# Patient Record
Sex: MALE | Race: WHITE | Employment: UNEMPLOYED | URBAN - NONMETROPOLITAN AREA
[De-identification: names, ages, dates, MRNs, and addresses within clinical notes are randomized per-mention and may not be internally consistent; named-entity substitution may affect disease eponyms.]

---

## 2021-01-27 ENCOUNTER — HOSPITAL ENCOUNTER (INPATIENT)
Age: 61
LOS: 12 days | Discharge: HOME HEALTH | DRG: 885 | End: 2021-02-08
Attending: PSYCHIATRY & NEUROLOGY | Admitting: PSYCHIATRY & NEUROLOGY
Payer: MEDICARE

## 2021-01-27 PROBLEM — F20.9 SCHIZOPHRENIA, CHRONIC WITH ACUTE EXACERBATION (HCC): Status: ACTIVE | Noted: 2021-01-27

## 2021-01-27 PROCEDURE — 1240000000 HC EMOTIONAL WELLNESS R&B

## 2021-01-27 PROCEDURE — 83036 HEMOGLOBIN GLYCOSYLATED A1C: CPT

## 2021-01-27 PROCEDURE — 6370000000 HC RX 637 (ALT 250 FOR IP): Performed by: PSYCHIATRY & NEUROLOGY

## 2021-01-27 PROCEDURE — 6360000002 HC RX W HCPCS: Performed by: NURSE PRACTITIONER

## 2021-01-27 PROCEDURE — 6370000000 HC RX 637 (ALT 250 FOR IP): Performed by: NURSE PRACTITIONER

## 2021-01-27 RX ORDER — ACETAMINOPHEN 500 MG
500 TABLET ORAL EVERY 4 HOURS PRN
Status: DISCONTINUED | OUTPATIENT
Start: 2021-01-27 | End: 2021-02-08 | Stop reason: HOSPADM

## 2021-01-27 RX ORDER — IBUPROFEN 600 MG/1
600 TABLET ORAL EVERY 8 HOURS PRN
Status: ON HOLD | COMMUNITY
End: 2021-02-08 | Stop reason: HOSPADM

## 2021-01-27 RX ORDER — ACETAMINOPHEN 325 MG/1
325 TABLET ORAL EVERY 4 HOURS PRN
Status: DISCONTINUED | OUTPATIENT
Start: 2021-01-27 | End: 2021-02-08 | Stop reason: HOSPADM

## 2021-01-27 RX ORDER — ACETAMINOPHEN 325 MG/1
650 TABLET ORAL EVERY 4 HOURS PRN
Status: DISCONTINUED | OUTPATIENT
Start: 2021-01-27 | End: 2021-01-27

## 2021-01-27 RX ORDER — HALOPERIDOL 5 MG/ML
5 INJECTION INTRAMUSCULAR EVERY 4 HOURS PRN
Status: DISCONTINUED | OUTPATIENT
Start: 2021-01-27 | End: 2021-02-08 | Stop reason: HOSPADM

## 2021-01-27 RX ORDER — POLYETHYLENE GLYCOL 3350 17 G/17G
17 POWDER, FOR SOLUTION ORAL DAILY PRN
Status: DISCONTINUED | OUTPATIENT
Start: 2021-01-27 | End: 2021-02-08 | Stop reason: HOSPADM

## 2021-01-27 RX ORDER — ATORVASTATIN CALCIUM 20 MG/1
20 TABLET, FILM COATED ORAL DAILY
Status: ON HOLD | COMMUNITY
End: 2021-02-08 | Stop reason: HOSPADM

## 2021-01-27 RX ORDER — SULFAMETHOXAZOLE AND TRIMETHOPRIM 800; 160 MG/1; MG/1
1 TABLET ORAL 2 TIMES DAILY
Status: ON HOLD | COMMUNITY
Start: 2021-01-25 | End: 2021-02-08 | Stop reason: HOSPADM

## 2021-01-27 RX ORDER — ARIPIPRAZOLE 30 MG/1
30 TABLET ORAL DAILY
Status: ON HOLD | COMMUNITY
End: 2021-02-08 | Stop reason: HOSPADM

## 2021-01-27 RX ORDER — ALBUTEROL SULFATE 90 UG/1
2 AEROSOL, METERED RESPIRATORY (INHALATION) EVERY 6 HOURS PRN
COMMUNITY

## 2021-01-27 RX ORDER — LORAZEPAM 1 MG/1
1 TABLET ORAL EVERY 4 HOURS PRN
Status: DISCONTINUED | OUTPATIENT
Start: 2021-01-27 | End: 2021-02-08 | Stop reason: HOSPADM

## 2021-01-27 RX ORDER — FAMOTIDINE 40 MG/1
40 TABLET, FILM COATED ORAL DAILY
Status: ON HOLD | COMMUNITY
End: 2021-02-08 | Stop reason: HOSPADM

## 2021-01-27 RX ORDER — TRAZODONE HYDROCHLORIDE 100 MG/1
100 TABLET ORAL NIGHTLY
Status: DISCONTINUED | OUTPATIENT
Start: 2021-01-27 | End: 2021-02-04

## 2021-01-27 RX ORDER — ACETAMINOPHEN 325 MG/1
650 TABLET ORAL EVERY 4 HOURS PRN
Status: DISCONTINUED | OUTPATIENT
Start: 2021-01-27 | End: 2021-02-08 | Stop reason: HOSPADM

## 2021-01-27 RX ORDER — MELOXICAM 15 MG/1
15 TABLET ORAL DAILY PRN
Status: ON HOLD | COMMUNITY
End: 2021-02-08 | Stop reason: HOSPADM

## 2021-01-27 RX ORDER — DIPHENHYDRAMINE HYDROCHLORIDE 50 MG/ML
50 INJECTION INTRAMUSCULAR; INTRAVENOUS EVERY 4 HOURS PRN
Status: DISCONTINUED | OUTPATIENT
Start: 2021-01-27 | End: 2021-02-08 | Stop reason: HOSPADM

## 2021-01-27 RX ORDER — IBUPROFEN 600 MG/1
600 TABLET ORAL EVERY 8 HOURS PRN
Status: DISCONTINUED | OUTPATIENT
Start: 2021-01-27 | End: 2021-02-08 | Stop reason: HOSPADM

## 2021-01-27 RX ORDER — LORAZEPAM 2 MG/ML
2 INJECTION INTRAMUSCULAR EVERY 4 HOURS PRN
Status: DISCONTINUED | OUTPATIENT
Start: 2021-01-27 | End: 2021-02-08 | Stop reason: HOSPADM

## 2021-01-27 RX ORDER — DOXEPIN HYDROCHLORIDE 10 MG/1
10 CAPSULE ORAL NIGHTLY
Status: ON HOLD | COMMUNITY
End: 2021-02-08 | Stop reason: HOSPADM

## 2021-01-27 RX ORDER — NICOTINE 21 MG/24HR
1 PATCH, TRANSDERMAL 24 HOURS TRANSDERMAL DAILY
Status: DISCONTINUED | OUTPATIENT
Start: 2021-01-27 | End: 2021-02-08 | Stop reason: HOSPADM

## 2021-01-27 RX ADMIN — TRAZODONE HYDROCHLORIDE 100 MG: 100 TABLET ORAL at 20:23

## 2021-01-27 RX ADMIN — LORAZEPAM 2 MG: 2 INJECTION INTRAMUSCULAR; INTRAVENOUS at 15:15

## 2021-01-27 RX ADMIN — LORAZEPAM 1 MG: 1 TABLET ORAL at 20:23

## 2021-01-27 RX ADMIN — IBUPROFEN 600 MG: 600 TABLET, FILM COATED ORAL at 20:23

## 2021-01-27 RX ADMIN — DIPHENHYDRAMINE HYDROCHLORIDE 50 MG: 50 INJECTION, SOLUTION INTRAMUSCULAR; INTRAVENOUS at 15:15

## 2021-01-27 RX ADMIN — HALOPERIDOL LACTATE 5 MG: 5 INJECTION, SOLUTION INTRAMUSCULAR at 15:15

## 2021-01-27 SDOH — HEALTH STABILITY: MENTAL HEALTH: HOW OFTEN DO YOU HAVE A DRINK CONTAINING ALCOHOL?: 4 OR MORE TIMES A WEEK

## 2021-01-27 ASSESSMENT — PAIN SCALES - GENERAL
PAINLEVEL_OUTOF10: 0
PAINLEVEL_OUTOF10: 6

## 2021-01-27 ASSESSMENT — LIFESTYLE VARIABLES: HISTORY_ALCOHOL_USE: YES

## 2021-01-27 NOTE — BH NOTE
Patients belongings collected      Brianedith Medina 1046 (CODE [id number])  [address]   [facility] Bag with [address] with toiletries and hygiene       Patients Room [address] [address]

## 2021-01-27 NOTE — PROGRESS NOTES
Pt arrived to unit and assisted to room from cot. Pt yelling, slamming door and threatening to cut staff after getting into room. Pt pacing around the room, with rapid speech, unintelligible at times, that he is getting out of here and to get his stuff so he can leave. Elliot Smith NP, called and orders for Benadryl 50 mg IM, Haldol 5 mg IM and Ativan 2mg IM given at 1515 emergently due to pt's behaviors.

## 2021-01-28 PROBLEM — F25.0 SCHIZOAFFECTIVE DISORDER, BIPOLAR TYPE (HCC): Status: ACTIVE | Noted: 2021-01-27

## 2021-01-28 LAB
ALBUMIN SERPL-MCNC: 4.2 GM/DL (ref 3.4–5)
ALBUMIN SERPL-MCNC: 4.2 GM/DL (ref 3.4–5)
ALP BLD-CCNC: 151 IU/L (ref 40–129)
ALP BLD-CCNC: 151 IU/L (ref 40–129)
ALT SERPL-CCNC: 16 U/L (ref 10–40)
ALT SERPL-CCNC: 16 U/L (ref 10–40)
ANION GAP SERPL CALCULATED.3IONS-SCNC: 5 MMOL/L (ref 4–16)
AST SERPL-CCNC: 22 IU/L (ref 15–37)
AST SERPL-CCNC: 22 IU/L (ref 15–37)
BILIRUB SERPL-MCNC: 0.2 MG/DL (ref 0–1)
BILIRUB SERPL-MCNC: 0.2 MG/DL (ref 0–1)
BILIRUBIN DIRECT: 0.2 MG/DL (ref 0–0.3)
BILIRUBIN, INDIRECT: 0 MG/DL (ref 0–0.7)
BUN BLDV-MCNC: 21 MG/DL (ref 6–23)
CALCIUM SERPL-MCNC: 8.7 MG/DL (ref 8.3–10.6)
CHLORIDE BLD-SCNC: 89 MMOL/L (ref 99–110)
CHOLESTEROL, FASTING: 151 MG/DL
CO2: 29 MMOL/L (ref 21–32)
CREAT SERPL-MCNC: 1.1 MG/DL (ref 0.9–1.3)
ESTIMATED AVERAGE GLUCOSE: 117 MG/DL
GFR AFRICAN AMERICAN: >60 ML/MIN/1.73M2
GFR NON-AFRICAN AMERICAN: >60 ML/MIN/1.73M2
GLUCOSE BLD-MCNC: 81 MG/DL (ref 70–99)
HBA1C MFR BLD: 5.7 % (ref 4.2–6.3)
HDLC SERPL-MCNC: 62 MG/DL
LDL CHOLESTEROL DIRECT: 77 MG/DL
POTASSIUM SERPL-SCNC: 4.4 MMOL/L (ref 3.5–5.1)
SODIUM BLD-SCNC: 123 MMOL/L (ref 135–145)
T4 FREE: 1.2 NG/DL (ref 0.9–1.8)
TOTAL PROTEIN: 6.8 GM/DL (ref 6.4–8.2)
TOTAL PROTEIN: 6.8 GM/DL (ref 6.4–8.2)
TRIGLYCERIDE, FASTING: 96 MG/DL
TSH HIGH SENSITIVITY: 1.15 UIU/ML (ref 0.27–4.2)

## 2021-01-28 PROCEDURE — 83036 HEMOGLOBIN GLYCOSYLATED A1C: CPT

## 2021-01-28 PROCEDURE — 6370000000 HC RX 637 (ALT 250 FOR IP): Performed by: PSYCHIATRY & NEUROLOGY

## 2021-01-28 PROCEDURE — 36415 COLL VENOUS BLD VENIPUNCTURE: CPT

## 2021-01-28 PROCEDURE — 86592 SYPHILIS TEST NON-TREP QUAL: CPT

## 2021-01-28 PROCEDURE — 84439 ASSAY OF FREE THYROXINE: CPT

## 2021-01-28 PROCEDURE — 1240000000 HC EMOTIONAL WELLNESS R&B

## 2021-01-28 PROCEDURE — 99223 1ST HOSP IP/OBS HIGH 75: CPT | Performed by: NURSE PRACTITIONER

## 2021-01-28 PROCEDURE — 80061 LIPID PANEL: CPT

## 2021-01-28 PROCEDURE — 80053 COMPREHEN METABOLIC PANEL: CPT

## 2021-01-28 PROCEDURE — 82248 BILIRUBIN DIRECT: CPT

## 2021-01-28 PROCEDURE — 84443 ASSAY THYROID STIM HORMONE: CPT

## 2021-01-28 PROCEDURE — 6370000000 HC RX 637 (ALT 250 FOR IP): Performed by: NURSE PRACTITIONER

## 2021-01-28 RX ORDER — SULFAMETHOXAZOLE AND TRIMETHOPRIM 800; 160 MG/1; MG/1
1 TABLET ORAL 2 TIMES DAILY
Status: COMPLETED | OUTPATIENT
Start: 2021-01-28 | End: 2021-01-29

## 2021-01-28 RX ORDER — ARIPIPRAZOLE 10 MG/1
30 TABLET ORAL DAILY
Status: DISCONTINUED | OUTPATIENT
Start: 2021-01-28 | End: 2021-01-28

## 2021-01-28 RX ORDER — ATORVASTATIN CALCIUM 20 MG/1
20 TABLET, FILM COATED ORAL NIGHTLY
Status: DISCONTINUED | OUTPATIENT
Start: 2021-01-28 | End: 2021-02-08 | Stop reason: HOSPADM

## 2021-01-28 RX ADMIN — ATORVASTATIN CALCIUM 20 MG: 20 TABLET, FILM COATED ORAL at 20:39

## 2021-01-28 RX ADMIN — TRAZODONE HYDROCHLORIDE 100 MG: 100 TABLET ORAL at 20:39

## 2021-01-28 RX ADMIN — HALOPERIDOL 7.5 MG: 5 TABLET ORAL at 20:38

## 2021-01-28 RX ADMIN — MUPIROCIN: 20 OINTMENT TOPICAL at 13:36

## 2021-01-28 RX ADMIN — LORAZEPAM 1 MG: 1 TABLET ORAL at 21:37

## 2021-01-28 RX ADMIN — ACETAMINOPHEN 650 MG: 325 TABLET ORAL at 08:45

## 2021-01-28 RX ADMIN — ACETAMINOPHEN 650 MG: 325 TABLET ORAL at 14:50

## 2021-01-28 RX ADMIN — SULFAMETHOXAZOLE AND TRIMETHOPRIM 1 TABLET: 800; 160 TABLET ORAL at 20:38

## 2021-01-28 RX ADMIN — SULFAMETHOXAZOLE AND TRIMETHOPRIM 1 TABLET: 800; 160 TABLET ORAL at 13:15

## 2021-01-28 RX ADMIN — IBUPROFEN 600 MG: 600 TABLET, FILM COATED ORAL at 21:37

## 2021-01-28 RX ADMIN — ARIPIPRAZOLE 30 MG: 10 TABLET ORAL at 13:15

## 2021-01-28 ASSESSMENT — PAIN SCALES - GENERAL
PAINLEVEL_OUTOF10: 8
PAINLEVEL_OUTOF10: 2
PAINLEVEL_OUTOF10: 6
PAINLEVEL_OUTOF10: 7
PAINLEVEL_OUTOF10: 0

## 2021-01-28 NOTE — GROUP NOTE
Group Therapy Note    Date: 1/28/2021    Group Start Time: 3735  Group End Time: 9145    Number of Participants: 3/6    Type: Spirituality    Group Topic/Objective: Religous discussion with Blanka Goode. Notes:  Pt attended group, but unable to remain focused on the discussion or the activity. Status After Intervention:  Unchanged    Participation Level: Minimal    Participation Quality: Inappropriate    Speech:  pressured and rapid    Thought Process/Content: Circumstantial    Affective Functioning: Exaggerated    Mood: elevated    Level of consciousness:  Preoccupied and Inattentive    Response to Learning: Pt unable to demonstrate response to learning at this time.      Endings: None Reported    Modes of Intervention: Education, Support and Socialization    Discipline Responsible: Certified Therapeutic Recreation Specialist     Electronically signed by Garry Go [address] [place] [address] MA on 1/28/2021 at 10:39 AM

## 2021-01-28 NOTE — PLAN OF CARE
[facility]  Initial Interdisciplinary Treatment Plan NOTE    Review Date & Time: 01/28 0830    Admission Type:   Admission Type:  Involuntary    Reason for admission:  Reason for Admission: Acute schizophrenia    Patient Admission Diagnosis: schizophrenia, chronic with acute exacerbation      PATIENT STRENGTHS:  Patient Strengths Strengths: (TIARA)  Patient Strengths and Limitations:Limitations: Multiple barriers to leisure interests, Difficult relationships / poor social skills  Addictive Behavior:Addictive Behavior  In the past 3 months, have you felt or has someone told you that you have a problem with:  : (admits to many addictive behaviors)  Do you have a history of Chemical Use?: Yes  Do you have a history of Alcohol Use?: Yes  Do you have a history of Street Drug Abuse?: Yes  Histroy of Prescripton Drug Abuse?: Yes  Medical Problems:  Past Medical History:   Diagnosis Date    Arthritis     COPD (chronic obstructive pulmonary disease) ([facility] [address]     Hyperlipidemia        EDUCATION:   Learner Progress Toward Treatment Goals: Reviewed goals and plan of care    Method: Group    Outcome: Needs reinforcement    PATIENT GOALS: med titration    PLAN/TREATMENT RECOMMENDATIONS UPDATE: med titration, compliance with unit programming    Estimated Length of Stay Update:  7 days  Estimated Discharge Date Update: 2/4/21  Discharge Criteria: med titration    SHORT-TERM GOALS UPDATE:   Time frame for Short-Term Goals: 5 days    LONG-TERM GOALS UPDATE:   Time frame for Long-Term Goals: 7 days  Members Present in Team Meeting: See Signature Sheet      PARAMJIT Orta

## 2021-01-28 NOTE — GROUP NOTE
Group Therapy Note    Date: 1/28/2021    Group Start Time: 0830  Group End Time: 0900    Number of Participants: 6/6    Type: Morning Goals Group/ Community Meeting    Group Topic/Objective: Set Goal For The Day and to review Unit Rules and Regulations. Patient's Goal:  Pt states his goal is \"Take a shower. \"     Notes:  Pt presented with rapid, tangential speech and flight of ideas. Pt did not stop talking throughout group, but would answer questions when addressed. Pt states he is feeling \"fine\" and is grateful \"I'm here. \"  Pt reports restful sleep of ~8 hours. Depression (0-10): 0    Anxiety (0-10): 0    Irritability/Aggitation (0-10): 0    Status After Intervention:  Improved    Participation Level: Interactive    Participation Quality: Pt talked throughout group, but would answer questions when addressed.      Speech:  Pressured, rapid, and tangential.     Thought Process/Content: Flight of Ideas    Affective Functioning: Exaggerated    Mood: elevated    Level of consciousness:  Preoccupied    Response to Learning: Able to verbalize current knowledge/experience    Endings: None Reported    Modes of Intervention: Education, Support and Socialization    Discipline Responsible: Certified Therapeutic Recreation Specialist     Electronically signed by Evangelina Carey MA on 1/28/2021 at 9:25 AM

## 2021-01-28 NOTE — PROGRESS NOTES
Pt seen by therapist from approximately [phone]. Pt states reason for admission is d/t \"I have an infection in the bottom of my toe. \"  Pt unable to identify positive leisure interests. Pt has no insight into his mental health.      Electronically signed by CLARISSE Cheung MA on 1/29/2021 at 10:05 AM

## 2021-01-28 NOTE — CONSULTS
Hospitalist Consult Note      Name:  Germania Rojas /Age/Sex: 1960  (61 y.o. male)   MRN & CSN:  [id number] & [id number] Admission Date/Time: 2021  3:31 PM   Location:  [facility]- PCP: No primary care provider on file. Hospital Day: 2    Assessment and Plan:   Germania Rojas is a 61 y.o.  male  who presents with Schizophrenia, chronic with acute exacerbation [facility])  Hospitalist Team consulted for: Medical Management    1. Hyponatremia: 123. Prerenal component with a BUN/creatinine ratio greater than 20. Also with antipsychotics. Recommend Gatorade versus free water. If no improvement can add sodium chloride 1 g 3 times daily with meals. Continue to monitor patient. Patient with no neurological deficit with psychiatric conditions complicating same. Daily BMPs for monitoring    2. Hyperlipidemia: Pending fasting lipid. However currently on atorvastatin 20 mg at bedtime continue same. 3.  Arthritis of the hands: Patient has ibuprofen for same. 600 mg 3 times daily as needed. 4.  Tardive dyskinesia: Lip smacking noted. Most likely with antipsychotic use. Continue valbenazine tosilate 80 mg daily    Appreciate allowing us participate in patient's care. Will follow along loosely due to hyponatremia. We will be available    Diet DIET GENERAL;   DVT Prophylaxis [] Lovenox, []  Heparin, [] SCDs, [] No VTE prophylaxis, patient ambulating   GI Prophylaxis [] PPI, [] H2 Blocker, [] No GI prophylaxis, patient is receiving diet/Tube Feeds   Code Status Full Code   Disposition Patient requires continued admission due to an SBU   MDM [] Low, [x] Moderate,[]  High  Patient's risk as above due to as above     History of Present Illness:     Pt S&E. No acute events overnight. Patient sitting at group table alone. Agrees to exam.  Denies any headache blurred vision or dizziness. Denies any chest pain palpitation shortness of breath. Denies any fever chills nausea or vomiting or diarrhea. Denies any abdominal pain. Denies any myalgias. Is complaining of left thumb pain that is chronic. Denies functional loss or decreased range of motion. Takes ibuprofen for same. Patient hard to redirect during interview and did not answer some questions. .      10-14 point ROS reviewed negative, unless as noted above    Objective:   No intake or output data in the 24 hours ending 01/28/21 1223   Vitals:   Vitals:    01/28/21 0745   BP: 116/66   Pulse: 93   Resp: 18   Temp: 97.3 °F (36.3 °C)   SpO2: 100%     Physical Exam:    GEN Awake male, sitting upright in bed in no apparent distress. Appears given age. EYES Pupils are equally round. No scleral erythema, discharge, or conjunctivitis. HENT Mucous membranes are moist. Oral pharynx without exudates, no evidence of thrush. NECK Supple, no apparent thyromegaly or masses. RESP Clear to auscultation, no wheezes, rales or rhonchi. Symmetric chest movement while on room air. CARDIO/VASC S1/S2 auscultated. Regular rate without appreciable murmurs, rubs, or gallops. No JVD or carotid bruits. Peripheral pulses equal bilaterally and palpable. No peripheral edema. GI Abdomen is soft without significant tenderness, masses, or guarding. Bowel sounds are normoactive. Rectal exam deferred.  No costovertebral angle tenderness. Normal appearing external genitalia. Harrison catheter is not present. HEME/LYMPH No palpable cervical lymphadenopathy and no hepatosplenomegaly. No petechiae or ecchymoses. MSK No gross joint deformities. SKIN Normal coloration, warm, dry. NEURO Cranial nerves appear grossly intact, normal speech, no lateralizing weakness. PSYCH Awake, alert, oriented x 4. Affect appropriate.     Medications:   Medications:    ARIPiprazole  30 mg Oral Daily    atorvastatin  20 mg Oral Nightly    Valbenazine Tosylate  80 mg Oral Daily    sulfamethoxazole-trimethoprim  1 tablet Oral BID    mupirocin   Topical Daily    nicotine  1 patch Transdermal Daily    influenza virus vaccine  0.5 mL Intramuscular Prior to discharge    traZODone  100 mg Oral Nightly      Infusions:   PRN Meds:     polyethylene glycol, 17 g, Daily PRN      haloperidol lactate, 5 mg, Q4H PRN    And      LORazepam, 2 mg, Q4H PRN    And      diphenhydrAMINE, 50 mg, Q4H PRN      acetaminophen, 650 mg, Q4H PRN      acetaminophen, 500 mg, Q4H PRN      acetaminophen, 325 mg, Q4H PRN      ibuprofen, 600 mg, Q8H PRN      LORazepam, 1 mg, Q4H PRN          Electronically signed by Marcella Aguirre MD on 1/28/2021 at 12:23 PM

## 2021-01-28 NOTE — PLAN OF CARE
Problem: Anger Management/Homicidal Ideation:  Goal: Able to display appropriate communication and problem solving  Description: Able to display appropriate communication and problem solving  Outcome: Ongoing  Goal: Ability to verbalize frustrations and anger appropriately will improve  Description: Ability to verbalize frustrations and anger appropriately will improve  Outcome: Ongoing  Goal: Absence of angry outbursts  Description: Absence of angry outbursts  Outcome: Ongoing  Goal: Absence of homicidal ideation  Description: Absence of homicidal ideation  Outcome: Ongoing  Goal: Participates in care planning  Description: Participates in care planning  Outcome: Ongoing  Goal: Patient specific goal  Description: Patient specific goal  Outcome: Ongoing     Problem: Pain:  Description: Pain management should include both nonpharmacologic and pharmacologic interventions.   Goal: Pain level will decrease  Description: Pain level will decrease  Outcome: Ongoing  Goal: Control of acute pain  Description: Control of acute pain  Outcome: Ongoing  Goal: Control of chronic pain  Description: Control of chronic pain  Outcome: Ongoing

## 2021-01-28 NOTE — H&P
thoughts to harm anyone else. Pt denied any auditory or visiual hallucintations. Social History:   He quit school in 11th grade. Patient attained a GED. He participated in [employer]. Denies being in the [facility]. He worked in [employer] and at age 21 received SSDI for schizophrenia. He has never  and has no children. Per patient he has a fiancee for the last five years. Legal entanglements include past of 3 disorderly conducts, criminal mischief and most recently allegedly acused of bomb threat. Patient has hx of [facility] time. Substance use Hx  Opiates, marijuana, methamphetamine, IV Dr use, Rx drug abuse    Past Psychiatric History:   Most recently was at [facility] for Psychiatry in [facility],  last month and linked with \"integrated services\"  Notes he has had multiple psych admissions related to schizophrenia and SAs. One was after he cut his wrists. Pt denied any hx of seizures, TBIs, Hep C or HIV  No TD noted, AIMS=0    Pt noted hx of previous inpt psychiatric admissions- see above  Pt reported several previous suicide attempts  Pt denied any family hx of suicides  Pt noted family mental health hx- father had schizophrenia    Pt denied any hx of abuse trauma or neglect. Alcohol: hx of legal complications including 3 disorderly conducts, criminal mischief involving alcohol use. Reports last use was at [facility].    Street drugs: history of crack, opiates, Mj  Tobacco: 1 PPD of cigarettes,  One pack of cigars daily  Caffeine: 5 per day- tea, energy drinks, coffee      Past Psychiatric History:   See note above    Family Psychiatric History:   See note above    Family Psychiatric History:   Family History   Problem Relation Age of Onset    Other Father     Other Sister         Allergies:  No Known Allergies     OBJECTIVE  Vital Signs:  Vitals:    01/28/21 0745   BP: 116/66   Pulse: 93   Resp: 18   Temp: 97.3 °F (36.3 °C)   SpO2: 100%       Labs:  Recent Results (from the past 48 hour(s))   TSH without Reflex    Collection Time: 01/28/21  7:00 AM   Result Value Ref Range    TSH, High Sensitivity 1.150 0.270 - 4.20 uIu/ml   T4, free    Collection Time: 01/28/21  7:00 AM   Result Value Ref Range    T4 Free 1.20 0.9 - 1.8 NG/DL   Comprehensive Metabolic Panel w/ Reflex to MG    Collection Time: 01/28/21  7:00 AM   Result Value Ref Range    Sodium 123 (L) 135 - 145 MMOL/L    Potassium 4.4 3.5 - 5.1 MMOL/L    Chloride 89 (L) 99 - 110 mMol/L    CO2 29 21 - 32 MMOL/L    BUN 21 6 - 23 MG/DL    CREATININE 1.1 0.9 - 1.3 MG/DL    Glucose 81 70 - 99 MG/DL    Calcium 8.7 8.3 - 10.6 MG/DL    Albumin 4.2 3.4 - 5.0 GM/DL    Total Protein 6.8 6.4 - 8.2 GM/DL    Total Bilirubin 0.2 0.0 - 1.0 MG/DL    ALT 16 10 - 40 U/L    AST 22 15 - 37 IU/L    Alkaline Phosphatase 151 (H) 40 - 129 IU/L    GFR Non-African American >60 >60 mL/min/1.73m2    GFR African American >60 >60 mL/min/1.73m2    Anion Gap 5 4 - 16   Hepatic function panel    Collection Time: 01/28/21  7:00 AM   Result Value Ref Range    Albumin 4.2 3.4 - 5.0 GM/DL    Total Bilirubin 0.2 0.0 - 1.0 MG/DL    Bilirubin, Direct 0.2 0.0 - 0.3 MG/DL    Bilirubin, Indirect 0.0 0 - 0.7 MG/DL    Alkaline Phosphatase 151 (H) 40 - 129 IU/L    AST 22 15 - 37 IU/L    ALT 16 10 - 40 U/L    Total Protein 6.8 6.4 - 8.2 GM/DL       Review of Systems:  Reports of no current cardiovascular, respiratory, gastrointestinal, genitourinary, integumentary, neurological, muscuoskeletal, or immunological symptoms today. PSYCHIATRIC: See HPI above. Neurologic examination:  Mental status: The patient is alert, attentive, and oriented. Speech is pressured and fluent with good repetition, comprehension, and naming. Cranial nerves:  CN II: Visual fields are full to confrontation. CN III, IV, VI: At primary gaze, there is no eye deviation. EOMs intact    CN V: Facial sensation is intact to pinprick in all 3 divisions bilaterally.       CN VII: Face is symmetric with normal eye closure and smile. CN VII: Hearing is normal to rubbing fingers    CN IX, X: Palate elevates symmetrically. Phonation is normal.    CN XI: Head turning(difficult due to arthritis) and shoulder shrug are intact    CN XII: Tongue is midline with normal movements and no atrophy. Motor: There is no pronator drift of out-stretched arms. Muscle bulk and tone are normal. Strength is full bilaterally. Reflexes:  Reflexes are 2+ and symmetric at the biceps, triceps, knees,    Sensory:  Light touch, pinprick, position sense, and vibration sense are intact in fingers and toes. Coordination:  Rapid alternating movements and fine finger movements are intact. There is no dysmetria on finger-to-nose and heel-knee-shin. There are no abnormal or extraneous movements. Romberg is absent. Gait/Stance:  Posture is normal. Gait is steady with normal steps, base, arm swing, and turning. Heel and toe walking are normal. Tandem gait is normal when the patient closes one of her eyes. PSYCHIATRIC EXAMINATION / MENTAL STATUS EXAM    CONSTITUTIONAL:    Vitals:   Vitals:    01/28/21 0745   BP: 116/66   Pulse: 93   Resp: 18   Temp: 97.3 °F (36.3 °C)   SpO2: 100%      General appearance: [x] appears age, []  appears older than stated age,               [x]  adequately dressed and groomed, [] disheveled,               [x]  in no acute distress, [] appears mildly distressed, [] other           MUSCULOSKELETAL:   Gait:   [x] normal, [] antalgic, [] unsteady, [] gait not evaluated   Station:             [] erect, [x] sitting, [] recumbent, [] other        Strength/tone:  [x] muscle strength and tone appear consistent with age and                                        condition     [] atrophy      [] abnormal movements  PSYCHIATRIC:    Appearance: appears stated age. alert and oriented to person, place, time & situation. no acute distress. Adequate grooming and hygeine. Good eye contact.  No prominent physical abnormalities. Attitude: Manner is cooperative and pleasant but with boughts of irritability  Motor: No psychomotor agitation, retardation or abnormal movements noted  Speech: Pressured and difficult to interrupt, Normal volume, tone &  Increased amount. Language: intact understanding and production  Mood: depressed  Affect: euthymic, full range, non-labile, congruent with mood and content of speech  Thought Production: Spontaneous. Thought Form: Coherent, linear, logical & goal-directed. No tangentiality or circumstantiality. No flight of ideas or loosening of associations. Thought Content/Perceptions: No JANE, no AVH, positive delusions and paranoia  Insight: limited  Judgment- limited  Memory: Immediate, recent, and remote appear intact, though not formally tested. Attention: maintained throughout interview  Fund of knowledge: Average  Gait/Balance: WNL/WNL         Impression:   Schizoaffective, currently depressed and paranoid    Problem List:   Schizoaffective disorder, bipolar type ([facility] [address]    Plan:  1. Admit to [facility] Unit  2. Consult hospitalist to evaluate and treat medical conditions  3. Adjust psychotropic medications to target symptoms  4. Occupational Therapy, Physical Therapy, Group Psychotherapy as tolerated  5. Reviewed treatment plan with patient including medication risks, benefits, side effects. Obtained informed consent for treatment. 6. Anticipated length of stay 10-12 days  7. I certify that inpatient psychiatric hospital admission is medically necessary for treatment, which can be expected to improve the patient's condition, and/or for diagnostic study. 8. Psychiatric management:medication initiation and titration, group and individual therapy, safe and theraputic environment. 9. Status of problem/condition: ?Improving  10. Medical co-morbidities: [facility], appreciate assistance  11. Legal Status:Pending  12.  The treatment team reviewed with the patient the diagnosis and treatment recommendations to include the risks, benefits, and side effects of chosen medications. 13. The patient verbalized understanding and agreed with the treatment regimen as outlined above. 14. The patient was encouraged to participate in groups. 15. Medical records, Labs, Diagnotic tests reviewed  16. q15 min safety checks for safety  17. Interval History. 18. Review current labs- BMP for 1/30/2021 to recheck NA. 19. Continue current medications- include gatorade due to low Na 123. Start to decrease patient from [address] and then begin Haldol 7.5 mg bid for acute exacerbation of schizophrenia  20. Supportive Therapy Provided  21. Pt had an opportunity to ask questions and address concerns  22. Pt encouraged to continue therapy group or individual.  23. Pt was in agreement with treatment plan. 24. The risks benefits and side effects of medications were discussed with the patient, including alternatives and no treatment.           Electronically signed by GARCIA Garcia CNP on 1/28/2021 at 12:47 PM

## 2021-01-29 LAB — RPR: NON REACTIVE

## 2021-01-29 PROCEDURE — 99232 SBSQ HOSP IP/OBS MODERATE 35: CPT | Performed by: NURSE PRACTITIONER

## 2021-01-29 PROCEDURE — 6370000000 HC RX 637 (ALT 250 FOR IP): Performed by: NURSE PRACTITIONER

## 2021-01-29 PROCEDURE — 1240000000 HC EMOTIONAL WELLNESS R&B

## 2021-01-29 RX ORDER — ARIPIPRAZOLE 5 MG/1
5 TABLET ORAL DAILY
Status: DISCONTINUED | OUTPATIENT
Start: 2021-01-31 | End: 2021-01-31

## 2021-01-29 RX ADMIN — HALOPERIDOL 7.5 MG: 5 TABLET ORAL at 09:09

## 2021-01-29 RX ADMIN — ATORVASTATIN CALCIUM 20 MG: 20 TABLET, FILM COATED ORAL at 20:35

## 2021-01-29 RX ADMIN — TRAZODONE HYDROCHLORIDE 100 MG: 100 TABLET ORAL at 20:34

## 2021-01-29 RX ADMIN — ACETAMINOPHEN 650 MG: 325 TABLET ORAL at 01:24

## 2021-01-29 RX ADMIN — SULFAMETHOXAZOLE AND TRIMETHOPRIM 1 TABLET: 800; 160 TABLET ORAL at 09:09

## 2021-01-29 RX ADMIN — ARIPIPRAZOLE 15 MG: 10 TABLET ORAL at 09:09

## 2021-01-29 RX ADMIN — SULFAMETHOXAZOLE AND TRIMETHOPRIM 1 TABLET: 800; 160 TABLET ORAL at 20:32

## 2021-01-29 RX ADMIN — ACETAMINOPHEN 650 MG: 325 TABLET ORAL at 18:36

## 2021-01-29 RX ADMIN — MUPIROCIN: 20 OINTMENT TOPICAL at 09:10

## 2021-01-29 RX ADMIN — HALOPERIDOL 7.5 MG: 5 TABLET ORAL at 20:32

## 2021-01-29 ASSESSMENT — PAIN SCALES - GENERAL
PAINLEVEL_OUTOF10: 7
PAINLEVEL_OUTOF10: 0
PAINLEVEL_OUTOF10: 7
PAINLEVEL_OUTOF10: 0

## 2021-01-29 NOTE — PLAN OF CARE
Problem: Anger Management/Homicidal Ideation:  Goal: Able to display appropriate communication and problem solving  Description: Able to display appropriate communication and problem solving  1/28/2021 2326 by Amanda Childers RN  Outcome: Ongoing  1/28/2021 1039 by Arias Walton RN  Outcome: Ongoing  Goal: Ability to verbalize frustrations and anger appropriately will improve  Description: Ability to verbalize frustrations and anger appropriately will improve  1/28/2021 2326 by Amanda Childers RN  Outcome: Ongoing  1/28/2021 1039 by Arias Walton RN  Outcome: Ongoing  Goal: Absence of angry outbursts  Description: Absence of angry outbursts  1/28/2021 2326 by Amanda Childers RN  Outcome: Ongoing  1/28/2021 1039 by Arias Walton RN  Outcome: Ongoing  Goal: Absence of homicidal ideation  Description: Absence of homicidal ideation  1/28/2021 2326 by Amanda Childers RN  Outcome: Ongoing  1/28/2021 1039 by Arias Walton RN  Outcome: Ongoing  Goal: Participates in care planning  Description: Participates in care planning  1/28/2021 2326 by Amanda Childers RN  Outcome: Ongoing  1/28/2021 1039 by Arias Waltno RN  Outcome: Ongoing  Goal: Patient specific goal  Description: Patient specific goal  1/28/2021 2326 by Amanda Childers RN  Outcome: Ongoing  1/28/2021 1039 by Arias Walton RN  Outcome: Ongoing     Problem: Pain:  Description: Pain management should include both nonpharmacologic and pharmacologic interventions.   Goal: Pain level will decrease  Description: Pain level will decrease  1/28/2021 2326 by Amanda Childers RN  Outcome: Ongoing  1/28/2021 1039 by Arias Walton RN  Outcome: Ongoing  Goal: Control of acute pain  Description: Control of acute pain  1/28/2021 2326 by Amanda Childers RN  Outcome: Ongoing  1/28/2021 1039 by Arias Walton RN  Outcome: Ongoing  Goal: Control of chronic pain  Description: Control of chronic pain  1/28/2021 2326 by Amanda Childers RN  Outcome: Ongoing  1/28/2021 1039 by Faye Major RN  Outcome: Ongoing

## 2021-01-29 NOTE — PROGRESS NOTES
Pt was compliant with scheduled HS meds but behaviors began to increase. Pt speech is harder to comprehend, even more pressured and rapid than previously observed. Pt then getting agitated towards this nurse for having difficulty understanding him and not answering his many questions immediately. Pt was confused during drsg change to R toe, saw tube of ointment used and thought this nurse was \"keeping it\" from him. He began verbally threatening and aggressively  posturing towards this nurse. Misunderstanding resolved and offered Pt PRN medication for anxiety. Pt accepts po per orders, awaiting effectiveness.

## 2021-01-29 NOTE — GROUP NOTE
Group Therapy Note    Date: 1/29/2021    Group Start Time: 1030  Group End Time: 1120  Group Topic: Recreational    [address]    Ph[facility] Lozada[facility] [place]        Group Therapy Note    Attendees: 5/5       Notes:  Pts participated in recreational therapy group; Bingo Activity. Pts were encouraged to engage in a leisure activity to promote socialization, positive mood, and coping with negative emotions. Pt attended group for ~15 minutes. Pt noted to talk to self for entire group. Pt left group reporting need to use the restroom and did not return.      Status After Intervention:  Improved    Participation Level: Interactive    Participation Quality: Attentive      Speech:  pressured and rapid      Thought Process/Content: Flight of ideas      Affective Functioning: Exaggerated      Mood: elevated      Level of consciousness:  Preoccupied      Response to Learning: Able to verbalize current knowledge/experience      Endings: None Reported    Modes of Intervention: Socialization and Activity      Discipline Responsible: Certified Therapeutic Recreation Specialist       Signature:  Pham Lozada [place], [place]

## 2021-01-29 NOTE — PROGRESS NOTES
Patient with increased anxiety and impulsivity this morning. Became verbally aggressive when limits were set with phone and fluid intake. Patient was able to calm down without medication. Pt much more stable this afternoon. Continues with rapid pressured speech but polite and appropriate. Endorses some depression and asked to speak with a grief counselor, but when offered to notify the  he didn't want us to yet. States he will let us know when he is ready. Participated in all groups but left prior to completion of morning and afternoon groups. Compliant with medications. Spends most of his time writing and coloring.

## 2021-01-29 NOTE — GROUP NOTE
Group Therapy Note    Date: 1/29/2021    Group Start Time: 1530  Group End Time: 1600    Number of Participants: 5/5    Type: Exercise/Recreation Group    Group Topic/Objective: Chair Exercises    Notes:  Pt participated in ~15 minutes of group before leaving. Pt reporting that the exercises are \"too hard\" for him to complete. Pt did not respond to prompting/encouragement to participate. Status After Intervention:  Improved    Participation Level: Moderate    Participation Quality: Pt struggled to focus in group.     Speech:  pressured and rapid    Thought Process/Content: Flight of ideas    Affective Functioning: Blunted    Mood: Blunted    Level of consciousness:  Preoccupied    Response to Learning: Able to verbalize current knowledge/experience    Endings: None Reported    Modes of Intervention: Activity and Movement    Discipline Responsible: Certified Therapeutic Recreation Specialist     Electronically signed by Valentino Knock, Criselda Price, MA on 1/29/2021 at 4:08 PM

## 2021-01-29 NOTE — PLAN OF CARE
Problem: Anger Management/Homicidal Ideation:  Goal: Able to display appropriate communication and problem solving  Description: Able to display appropriate communication and problem solving  1/29/2021 1215 by Cj Knight RN  Outcome: Ongoing  1/28/2021 2326 by Trang Tong RN  Outcome: Ongoing  Goal: Ability to verbalize frustrations and anger appropriately will improve  Description: Ability to verbalize frustrations and anger appropriately will improve  1/29/2021 1215 by Cj Knight RN  Outcome: Ongoing  1/28/2021 2326 by Trang Tong RN  Outcome: Ongoing  Goal: Absence of angry outbursts  Description: Absence of angry outbursts  1/29/2021 1215 by Cj Knight RN  Outcome: Ongoing  1/28/2021 2326 by Trang Tong RN  Outcome: Ongoing  Goal: Absence of homicidal ideation  Description: Absence of homicidal ideation  1/29/2021 1215 by Cj Knight RN  Outcome: Ongoing  1/28/2021 2326 by Trang Tong RN  Outcome: Ongoing  Goal: Participates in care planning  Description: Participates in care planning  1/29/2021 1215 by Cj Knight RN  Outcome: Ongoing  1/28/2021 2326 by Trang Tong RN  Outcome: Ongoing  Goal: Patient specific goal  Description: Patient specific goal  1/29/2021 1215 by Cj Knight RN  Outcome: Ongoing  1/28/2021 2326 by Trang Tong RN  Outcome: Ongoing     Problem: Pain:  Description: Pain management should include both nonpharmacologic and pharmacologic interventions.   Goal: Pain level will decrease  Description: Pain level will decrease  1/29/2021 1215 by Cj Knight RN  Outcome: Ongoing  1/28/2021 2326 by Trang Tong RN  Outcome: Ongoing  Goal: Control of acute pain  Description: Control of acute pain  1/29/2021 1215 by Cj Knight RN  Outcome: Ongoing  1/28/2021 2326 by Trang Tong RN  Outcome: Ongoing  Goal: Control of chronic pain  Description: Control of chronic pain  1/29/2021 1215 by Cj Knight RN  Outcome: Ongoing  1/28/2021 2326 by Erik Healy RN  Outcome: Ongoing

## 2021-01-29 NOTE — PROGRESS NOTES
PRN medication was effective and Pt resting in bed eyes closed, respirations even and unlabored. No s/s of adverse reaction.

## 2021-01-29 NOTE — GROUP NOTE
Group Therapy Note    Date: 1/28/2021    Group Start Time: 2497  Group End Time: 1600    Number of Participants: 4/4    Type: Exercise/Recreation Group    Group Topic/Objective: Chair Exercises    Notes:  Pt completed majority of the exercises. Pt talked throughout the group and unable to stop when prompted. Pt noted with flight of ideas. Status After Intervention:  Improved    Participation Level:  Active Listener and Interactive    Participation Quality: Appropriate, Attentive and Sharing    Speech:  Rapid and tangential    Thought Process/Content: Flight of ideas    Affective Functioning: Exaggerated    Mood: elevated    Level of consciousness:  Attentive    Response to Learning: Able to verbalize current knowledge/experience and Able to verbalize/acknowledge new learning    Endings: None Reported    Modes of Intervention: Activity and Movement    Discipline Responsible: Certified Therapeutic Recreation Specialist     Electronically signed by Lynnwood Holstein, CTRS, MA on 1/29/2021 at 9:20 AM

## 2021-01-29 NOTE — PROGRESS NOTES
Pt with intermittent sleep and requesting snacks and po fluids excessively. Pt now sitting in DR coloring. Mood is improved and he is some what calm. Speech is pressured but less rapid and more comprehensible.

## 2021-01-29 NOTE — PROGRESS NOTES
Psychiatric Progress Note    Teddy Gilford  [id number]  01/29/21    ID: Patient is a 61 yrs y.o. male     CC:My toe. ..... Aydin Angry    HPI: Teddy Gilford is a 61year old  single male with PMHx of schizoaffective disorder, paranoid type, COPD, GERD, arthritis,  and HLD. Patient was sent to SNF from [facility] 1/20/21, as he required wound care and oral antibiotics after podiatry debrided wound. Patient returned to hospital after leaving SNF AMA. SNF, per notes will not take him back due to behavioral issues. While at the hospital patient was placed on a psychiatric hold due to HI and delusions. Other notes sent with the patient report he has been \"grandiose, disorganized, pressured speech and responding to internal stimuli. \" Notes state he asked about Haldol dec but \"not having a shot in a long time.:\" Patient was transferred from [address] [place] [address] to [facility]. When he arrived he signed the voluntary form. Pt remains in agreement with treatment plan. Pt Continues to deny and side-effects to current medications. Pt was polite and cordal during the interview process. Has been taking medications and has been compliant with treatment. Tolerating medications without side effect complaints. Patient reports he will not take valbenazine as it is too expensive. Noticed continued lip smacking which patient reports not caring about. Called Pharmacy([facility]) to enquire about deutetrabenzazine. Pt noted he is doing  \" good today. \"  Pt noted he feels safe and comfortable on the unit. Pt was polite and cordial during the interview process but if not feeling heard, he would get easily agitated. Currently he denies SI/HI and AV hallucinations. He rates his depression as \"3-4\" on a scale of 0 to 10 with 0 being none and 10 being horrible. He rates his anxiety as \"0\" on the same scale. speech continues to be rapid and pressured. Can be difficult to interrupt.  He states he was not able to sleep last night; per staff he slept 4 hours of broken sleep. He is oriented x 4    He states he was linked in the past with [facility] [employer] and would like to return to them. He also states his Rx is in [address] [place] [address] off [address].    No Known Allergies    Medications Prior to Admission: sulfamethoxazole-trimethoprim (BACTRIM DS;SEPTRA DS) 800-160 MG per tablet, Take 1 tablet by mouth 2 times daily  atorvastatin (LIPITOR) 20 MG tablet, Take 20 mg by mouth daily  famotidine (PEPCID) 40 MG tablet, Take 40 mg by mouth daily At bedtime  ibuprofen (ADVIL;MOTRIN) 600 MG tablet, Take 600 mg by mouth every 8 hours as needed for Pain  albuterol sulfate HFA (VENTOLIN HFA) 108 (90 Base) MCG/ACT inhaler, Inhale 2 puffs into the lungs every 6 hours as needed for Wheezing  ARIPiprazole (ABILIFY) 30 MG tablet, Take 30 mg by mouth daily  meloxicam (MOBIC) 15 MG tablet, Take 15 mg by mouth daily as needed for Pain Indications: Adjunct to Opioid Analgesia for Moderate to Severe Pain  doxepin (SINEQUAN) 10 MG capsule, Take 10 mg by mouth nightly  Valbenazine Tosylate 80 MG CAPS, Take by mouth daily  mupirocin (BACTROBAN) 2 % ointment, Apply topically daily Apply topically 3 times daily.     Past Medical History:   Diagnosis Date    Arthritis     COPD [facility]chronic obstructive [facility] disease) [facility]     Hyperlipidemia         Patient Active Problem List   Diagnosis    Schizoaffective disorder, bipolar type ([facility]       Review of Systems    OBJECTIVE  Vital Signs:  Vitals:    01/29/21 0745   BP: (!) 112/54   Pulse: 86   Resp: 16   Temp: 97.4 °F (36.3 °C)   SpO2: 98%       Labs:  Recent Results (from the past 48 hour(s))   TSH without Reflex    Collection Time: 01/28/21  7:00 AM   Result Value Ref Range    TSH, High Sensitivity 1.150 0.270 - 4.20 uIu/ml   T4, free    Collection Time: 01/28/21  7:00 AM   Result Value Ref Range    T4 Free 1.20 0.9 - 1.8 NG/DL   Lipid, Fasting    Collection Time: 01/28/21  7:00 AM   Result Value Ref Range    Triglyceride, Fasting 96 <150 MG/DL    Cholesterol, Fasting 151 <200 MG/DL    HDL 62 >40 MG/DL    LDL Direct 77 <100 MG/DL   Comprehensive Metabolic Panel w/ Reflex to MG    Collection Time: 01/28/21  7:00 AM   Result Value Ref Range    Sodium 123 (L) 135 - 145 MMOL/L    Potassium 4.4 3.5 - 5.1 MMOL/L    Chloride 89 (L) 99 - 110 mMol/L    CO2 29 21 - 32 MMOL/L    BUN 21 6 - 23 MG/DL    CREATININE 1.1 0.9 - 1.3 MG/DL    Glucose 81 70 - 99 MG/DL    Calcium 8.7 8.3 - 10.6 MG/DL    Albumin 4.2 3.4 - 5.0 GM/DL    Total Protein 6.8 6.4 - 8.2 GM/DL    Total Bilirubin 0.2 0.0 - 1.0 MG/DL    ALT 16 10 - 40 U/L    AST 22 15 - 37 IU/L    Alkaline Phosphatase 151 (H) 40 - 129 IU/L    GFR Non-African American >60 >60 mL/min/1.73m2    GFR African American >60 >60 mL/min/1.73m2    Anion Gap 5 4 - 16   Hepatic function panel    Collection Time: 01/28/21  7:00 AM   Result Value Ref Range    Albumin 4.2 3.4 - 5.0 GM/DL    Total Bilirubin 0.2 0.0 - 1.0 MG/DL    Bilirubin, Direct 0.2 0.0 - 0.3 MG/DL    Bilirubin, Indirect 0.0 0 - 0.7 MG/DL    Alkaline Phosphatase 151 (H) 40 - 129 IU/L    AST 22 15 - 37 IU/L    ALT 16 10 - 40 U/L    Total Protein 6.8 6.4 - 8.2 GM/DL   Hemoglobin A1c    Collection Time: 01/28/21  7:00 AM   Result Value Ref Range    Hemoglobin A1C 5.7 4.2 - 6.3 %    eAG 117 mg/dL   RPR     Collection Time: 01/28/21  7:00 AM   Result Value Ref Range    RPR NON REACTIVE NON REACTIVE       PSYCHIATRIC ASSESSMENT / MENTAL STATUS EXAM:   Vitals: Blood pressure (!) 112/54, pulse 86, temperature 97.4 °F (36.3 °C), temperature source Temporal, resp. rate 16, height 5' 8\" (1.727 m), weight 153 lb 14.1 oz (69.8 kg), SpO2 98 %. CONSTITUTIONAL:      Appearance: appears stated age. alert and oriented to person, place, time & situation. no acute distress. Adequate grooming and hygeine. Good eye contact. No prominent physical abnormalities. Attitude:  Manner is cooperative and pleasant but with boughts of irritability  Motor: No psychomotor agitation, retardation or abnormal movements noted  Speech: Pressured and difficult to interrupt, Normal volume, tone &  Increased amount. Language: intact understanding and production  Mood: depressed  Affect: euthymic, full range, non-labile, congruent with mood and content of speech  Thought Production: Spontaneous. Thought Form: Coherent, linear, logical & goal-directed. No tangentiality or circumstantiality. No flight of ideas or loosening of associations. Thought Content/Perceptions: No JANE, no AVH, positive delusions and paranoia  Insight: limited  Judgment- limited  Memory: Immediate, recent, and remote appear intact, though not formally tested. Attention: maintained throughout interview  Fund of knowledge: Average  Gait/Balance: WNL/WNL         Impression:   Schizoaffective, currently depressed and paranoid     Problem List:   Schizoaffective disorder, bipolar type ([facility]         PLAN:  Psychiatric management:medication initiation and titration, group and individual therapy, safe and theraputic environment. Status of problem/condition: ?Improving  Medical co-morbidities: [facility], appreciate assistance  Legal Status:Pending  Disposition:estimated LOS: Pending. The treatment team reviewed with the patient the diagnosis and treatment recommendations to include the risks, benefits, and side effects of chosen medications. The patient verbalized understanding and agreed with the treatment regimen as outlined above. The patient was encouraged to participate in groups. Medical records, Labs, Diagnotic tests reviewed  q15 min safety checks for safety  Interval History. Review current labs -BMP for 1/30/2021 to recheck NA. Continue current medications-include gatorade due to low Na 123. Start to decrease patient from Abiify 15 mg po, x2 days and stop. Start Abilify 5 mg po 1/30/2021 x2 days and stop.   Continue  Haldol 7.5 mg bid for acute exacerbation of schizophrenia  Supportive Therapy Provided  Pt had an opportunity to ask questions and address concerns  Pt encouraged to continue therapy group or individual.  Pt was in agreement with treatment plan. The risks benefits and side effects of medications were discussed with the patient, including alternatives and no treatment.     Electronically signed by GARCIA Watkins CNP on 1/29/2021 at 10:26 AM

## 2021-01-29 NOTE — GROUP NOTE
Group Therapy Note    Date: 1/29/2021    Group Start Time: 5440  Group End Time: 510  Group Topic: Psychotherapy    [facility]        Group Therapy Note    Attendees: 3/5         Notes:   Patient invited to attend. Patient attended and participated in group on relaxation.       Status After Intervention:  Improved    Participation Level: Interactive and Monopolizing    Participation Quality: Appropriate and Sharing      Speech:  pressured      Thought Process/Content: Flight of ideas      Affective Functioning: Congruent      Mood: elevated      Level of consciousness:  Alert and Attentive      Response to Learning: Able to verbalize current knowledge/experience      Endings: None Reported    Modes of Intervention: Support, Socialization and Exploration      Discipline Responsible: /Counselor      Signature:  PARAMJIT Hart

## 2021-01-30 LAB
ANION GAP SERPL CALCULATED.3IONS-SCNC: 6 MMOL/L (ref 4–16)
BUN BLDV-MCNC: 13 MG/DL (ref 6–23)
CALCIUM SERPL-MCNC: 9.4 MG/DL (ref 8.3–10.6)
CHLORIDE BLD-SCNC: 97 MMOL/L (ref 99–110)
CO2: 31 MMOL/L (ref 21–32)
CREAT SERPL-MCNC: 1 MG/DL (ref 0.9–1.3)
GFR AFRICAN AMERICAN: >60 ML/MIN/1.73M2
GFR NON-AFRICAN AMERICAN: >60 ML/MIN/1.73M2
GLUCOSE BLD-MCNC: 107 MG/DL (ref 70–99)
POTASSIUM SERPL-SCNC: 4.4 MMOL/L (ref 3.5–5.1)
SODIUM BLD-SCNC: 134 MMOL/L (ref 135–145)

## 2021-01-30 PROCEDURE — 1240000000 HC EMOTIONAL WELLNESS R&B

## 2021-01-30 PROCEDURE — 6370000000 HC RX 637 (ALT 250 FOR IP): Performed by: NURSE PRACTITIONER

## 2021-01-30 PROCEDURE — 99232 SBSQ HOSP IP/OBS MODERATE 35: CPT | Performed by: PSYCHIATRY & NEUROLOGY

## 2021-01-30 PROCEDURE — 36415 COLL VENOUS BLD VENIPUNCTURE: CPT

## 2021-01-30 PROCEDURE — 80048 BASIC METABOLIC PNL TOTAL CA: CPT

## 2021-01-30 RX ADMIN — ACETAMINOPHEN 650 MG: 325 TABLET ORAL at 21:25

## 2021-01-30 RX ADMIN — MUPIROCIN: 20 OINTMENT TOPICAL at 07:37

## 2021-01-30 RX ADMIN — TRAZODONE HYDROCHLORIDE 100 MG: 100 TABLET ORAL at 20:47

## 2021-01-30 RX ADMIN — HALOPERIDOL 7.5 MG: 5 TABLET ORAL at 20:47

## 2021-01-30 RX ADMIN — ATORVASTATIN CALCIUM 20 MG: 20 TABLET, FILM COATED ORAL at 20:47

## 2021-01-30 RX ADMIN — ARIPIPRAZOLE 15 MG: 10 TABLET ORAL at 07:39

## 2021-01-30 RX ADMIN — ACETAMINOPHEN 650 MG: 325 TABLET ORAL at 08:32

## 2021-01-30 RX ADMIN — HALOPERIDOL 7.5 MG: 5 TABLET ORAL at 08:14

## 2021-01-30 ASSESSMENT — PAIN SCALES - GENERAL
PAINLEVEL_OUTOF10: 4
PAINLEVEL_OUTOF10: 7

## 2021-01-30 NOTE — GROUP NOTE
Group Therapy Note    Date: 1/30/2021    Group Start Time: 1030  Group End Time: 1100    Number of Participants: 4/6    Type: Exercise/Recreation Group    Group Topic/Objective: Chair Exercises    Notes:  Pt participated in ~15 minutes of group. Pt then reported exercises were \"too hard\" and refused to participate further. Pt was noted to talk to self less than he has in previous groups. Status After Intervention:  Improved    Participation Level:  Moderate    Participation Quality: Attentive    Speech:  pressured and rapid    Thought Process/Content: Flight of ideas    Affective Functioning: Congruent    Mood: Bright    Level of consciousness:  Preoccupied    Response to Learning: Able to verbalize current knowledge/experience and Able to verbalize/acknowledge new learning    Endings: None Reported    Modes of Intervention: Activity and Movement    Discipline Responsible: Certified Therapeutic Recreation Specialist     Electronically signed by CLARISSE Banerjee, MA on 1/30/2021 at 11:03 AM

## 2021-01-30 NOTE — PLAN OF CARE
Problem: Anger Management/Homicidal Ideation:  Goal: Able to display appropriate communication and problem solving  Description: Able to display appropriate communication and problem solving  1/30/2021 0906 by Lucina Jacinto RN  Outcome: Ongoing  1/30/2021 0534 by Hortensia Arauz RN  Outcome: Ongoing  Goal: Ability to verbalize frustrations and anger appropriately will improve  Description: Ability to verbalize frustrations and anger appropriately will improve  1/30/2021 0906 by Lucina Jacinto RN  Outcome: Ongoing  1/30/2021 0534 by Hortensia Arauz RN  Outcome: Ongoing  Goal: Absence of angry outbursts  Description: Absence of angry outbursts  1/30/2021 0906 by Lucina Jacinto RN  Outcome: Ongoing  1/30/2021 0534 by Hortensia Arauz RN  Outcome: Ongoing  Goal: Absence of homicidal ideation  Description: Absence of homicidal ideation  1/30/2021 0906 by Lucina Jacinto RN  Outcome: Ongoing  1/30/2021 0534 by Hortensia Aaruz RN  Outcome: Ongoing  Goal: Participates in care planning  Description: Participates in care planning  1/30/2021 0906 by Lucina Jacinto RN  Outcome: Ongoing  1/30/2021 0534 by Hortensia Arauz RN  Outcome: Ongoing  Goal: Patient specific goal  Description: Patient specific goal  1/30/2021 0906 by Lucina Jacinto RN  Outcome: Ongoing  1/30/2021 0534 by Hortensia Arauz RN  Outcome: Ongoing     Problem: Pain:  Goal: Pain level will decrease  Description: Pain level will decrease  1/30/2021 0906 by Lucina Jacinto RN  Outcome: Ongoing  1/30/2021 0534 by Hortensia Arauz RN  Outcome: Ongoing  Goal: Control of acute pain  Description: Control of acute pain  1/30/2021 0906 by Lucina Jacinto RN  Outcome: Ongoing  1/30/2021 0534 by Hortensia Arauz RN  Outcome: Ongoing  Goal: Control of chronic pain  Description: Control of chronic pain  1/30/2021 0906 by Lucina Jacinto RN  Outcome: Ongoing  1/30/2021 0534 by Hortensia Arauz RN  Outcome: Ongoing

## 2021-01-30 NOTE — PROGRESS NOTES
Psychiatric Progress Note    Noman Bhardwaj  [id number]  01/30/21    ID: Patient is a 61 yrs y.o. male     CC: I need to order things from [employer] and more. ..... Delsa Severance    HPI: Noman Bhardwaj is a 61year old  single male with PMHx of schizoaffective disorder, paranoid type, COPD, GERD, arthritis,  and HLD. Patient was sent to SNF from [facility] 1/20/21, as he required wound care and oral antibiotics after podiatry debrided wound. Patient returned to hospital after leaving SNF AMA. SNF, per notes will not take him back due to behavioral issues. While at the hospital patient was placed on a psychiatric hold due to HI and delusions. Other notes sent with the patient report he has been \"grandiose, disorganized, pressured speech and responding to internal stimuli. \" Notes state he asked about Haldol dec but \"not having a shot in a long time.:\" Patient was transferred from [facility] to [id number] [place] [address]. When he arrived he signed the voluntary form. Pt remains in agreement with treatment plan. Pt Continues to deny and side-effects to current medications. Pt was polite and cordal during the interview process. Has been taking medications and has been compliant with treatment. Tolerating medications without side effect complaints. Pt noted he is doing  \"pretty good today. \"  Pt noted he feels safe and comfortable on the unit. Pt was polite and cordial during the interview process. Currently he denies SI/HI. Pt denies any AV hallucinations yet continues to display bizarre delusions at times. He rates his depression as \"3-4\" on a scale of 0 to 10 with 0 being none and 10 being horrible. He rates his anxiety as \"0\" on the same scale. speech continues to be rapid and pressured. Can be difficult to interrupt. He states he was not able to sleep last night; per staff he slept 4 hours of broken sleep. He is oriented x 4    He states he was linked in the past with [employer] and would like to return to them.  He also states his Rx is in [facility].    No Known Allergies    Medications Prior to Admission: sulfamethoxazole-trimethoprim (BACTRIM DS;SEPTRA DS) 800-160 MG per tablet, Take 1 tablet by mouth 2 times daily  atorvastatin (LIPITOR) 20 MG tablet, Take 20 mg by mouth daily  famotidine (PEPCID) 40 MG tablet, Take 40 mg by mouth daily At bedtime  ibuprofen (ADVIL;MOTRIN) 600 MG tablet, Take 600 mg by mouth every 8 hours as needed for Pain  albuterol sulfate HFA (VENTOLIN HFA) 108 (90 Base) MCG/ACT inhaler, Inhale 2 puffs into the lungs every 6 hours as needed for Wheezing  ARIPiprazole (ABILIFY) 30 MG tablet, Take 30 mg by mouth daily  meloxicam (MOBIC) 15 MG tablet, Take 15 mg by mouth daily as needed for Pain Indications: Adjunct to Opioid Analgesia for Moderate to Severe Pain  doxepin (SINEQUAN) 10 MG capsule, Take 10 mg by mouth nightly  Valbenazine Tosylate 80 MG CAPS, Take by mouth daily  mupirocin (BACTROBAN) 2 % ointment, Apply topically daily Apply topically 3 times daily. Past Medical History:   Diagnosis Date    Arthritis     COPD (chronic obstructive [facility] disease) ([facility]     Hyperlipidemia         Patient Active Problem List   Diagnosis    Schizoaffective disorder, bipolar type ([facility]       Review of Systems    OBJECTIVE  Vital Signs:  Vitals:    01/30/21 0821   BP: 120/73   Pulse: 82   Resp: 16   Temp: 97.3 °F (36.3 °C)   SpO2: 100%       Labs:  No results found for this or any previous visit (from the past 48 hour(s)). PSYCHIATRIC ASSESSMENT / MENTAL STATUS EXAM:   Vitals: Blood pressure 120/73, pulse 82, temperature 97.3 °F (36.3 °C), temperature source Temporal, resp. rate 16, height 5' 8\" (1.727 m), weight 153 lb 14.1 oz (69.8 kg), SpO2 100 %. CONSTITUTIONAL:      Appearance: appears stated age. alert and oriented to person, place, time & situation. no acute distress. Adequate grooming and hygeine. Good eye contact. No prominent physical abnormalities.   Attitude: Manner is cooperative and pleasant but with boughts of irritability  Motor: No psychomotor agitation, retardation or abnormal movements noted  Speech: Pressured and difficult to interrupt, Normal volume, tone &  Increased amount. Language: intact understanding and production  Mood: depressed  Affect: euthymic, full range, non-labile, congruent with mood and content of speech  Thought Production: Spontaneous. Thought Form: Coherent, linear, logical & goal-directed. No tangentiality or circumstantiality. No flight of ideas or loosening of associations. Thought Content/Perceptions: No JANE, no AVH, positive delusions and paranoia  Insight: limited  Judgment- limited  Memory: Immediate, recent, and remote appear intact, though not formally tested. Attention: maintained throughout interview  Fund of knowledge: Average  Gait/Balance: WNL/WNL         Impression:   Schizoaffective, currently depressed and paranoid     Problem List:   Schizoaffective disorder, bipolar type ([facility]         PLAN:  Psychiatric management:medication initiation and titration, group and individual therapy, safe and theraputic environment. Status of problem/condition: ?Improving  Medical co-morbidities: [facility], appreciate assistance  Legal Status:Pending  Disposition:estimated LOS: Pending. The treatment team reviewed with the patient the diagnosis and treatment recommendations to include the risks, benefits, and side effects of chosen medications. The patient verbalized understanding and agreed with the treatment regimen as outlined above. The patient was encouraged to participate in groups. Medical records, Labs, Diagnotic tests reviewed  q15 min safety checks for safety  Interval History. Review current labs -BMP for 1/30/2021 to recheck NA. Continue current medications-include gatorade due to low Na 123. Start to decrease patient from Abiify 15 mg po, x2 days and stop.  Start Abilify 5 mg po 1/30/2021 x2 days and stop. Continue  Haldol 7.5 mg bid for acute exacerbation of schizophrenia  Supportive Therapy Provided  Pt had an opportunity to ask questions and address concerns  Pt encouraged to continue therapy group or individual.  Pt was in agreement with treatment plan. The risks benefits and side effects of medications were discussed with the patient, including alternatives and no treatment.     Electronically signed by Preeti Hurst DO on 1/30/2021 at 12:15 PM

## 2021-01-30 NOTE — GROUP NOTE
Group Therapy Note    Date: 1/30/2021    Group Start Time: 0830  Group End Time: 0900     Number of Participants: 6/6    Type: Morning Goals Group/ Community Meeting    Group Topic/Objective: Set Goal For The Day and to review Unit Rules and Regulations. Patient's Goal:  Pt states his goal is \"Read these. \" r/t books at the table. Notes:  Pt presented with rapid, pressured speech and tangential thinking. Pt states he is feeling \"better. \" and is grateful for \"my social security check. \" which pt expressed belief that is going to [place] thousands of dollars. \"  Pt reports restful sleep of ~6 hours.      Depression (0-10): 1-2    Anxiety (0-10): 0    Irritability/Aggitation (0-10): 0    Status After Intervention:  Improved    Participation Level: Interactive    Participation Quality: Sharing    Speech:  pressured and rapid    Thought Process/Content: Tangential    Affective Functioning: Exaggerated    Mood: elevated    Level of consciousness:  Alert    Response to Learning: Able to verbalize current knowledge/experience    Endings: None Reported    Modes of Intervention: Education, Support and Socialization    Discipline Responsible: Certified Therapeutic Recreation Specialist     Electronically signed by Waqas Fields MA on 1/30/2021 at 9:03 AM

## 2021-01-30 NOTE — PROGRESS NOTES
Patient alert and oriented. His speech is rapid and difficult to understand. His movements are rapid. He is directible and cooperative. He is distracted by writing and coloring. He currently denies SI/HI, auditory or visual hallucinations. States his thoughts are better when he is taking medications. Requesting to call [facility]. Unable to state who he wants to speak to there. Phone number called. Employee who answered was not familiar with patient. Right great toe cleansed with soap and water. Non adherent dressing with mupirocin applied and wrapped in Kerlex.

## 2021-01-30 NOTE — BH NOTE
Spenser Alfonso spent time on the milieu. He was polite on approach and cooperative with vitals. He had a snack. No interaction with peers. His speech is rapid and mumbling. He is difficult to understand at times. He rated depression 1/10. He denied SI/HI, anxiety and A/V hallucinations.

## 2021-01-31 PROCEDURE — 6370000000 HC RX 637 (ALT 250 FOR IP): Performed by: PSYCHIATRY & NEUROLOGY

## 2021-01-31 PROCEDURE — 99232 SBSQ HOSP IP/OBS MODERATE 35: CPT | Performed by: PSYCHIATRY & NEUROLOGY

## 2021-01-31 PROCEDURE — 1240000000 HC EMOTIONAL WELLNESS R&B

## 2021-01-31 PROCEDURE — 6370000000 HC RX 637 (ALT 250 FOR IP): Performed by: NURSE PRACTITIONER

## 2021-01-31 RX ORDER — BENZTROPINE MESYLATE 1 MG/1
2 TABLET ORAL NIGHTLY
Status: DISCONTINUED | OUTPATIENT
Start: 2021-01-31 | End: 2021-02-08 | Stop reason: HOSPADM

## 2021-01-31 RX ORDER — HALOPERIDOL 5 MG
10 TABLET ORAL 2 TIMES DAILY
Status: DISCONTINUED | OUTPATIENT
Start: 2021-01-31 | End: 2021-02-02

## 2021-01-31 RX ADMIN — ACETAMINOPHEN 650 MG: 325 TABLET ORAL at 20:16

## 2021-01-31 RX ADMIN — ACETAMINOPHEN 650 MG: 325 TABLET ORAL at 02:26

## 2021-01-31 RX ADMIN — HALOPERIDOL 7.5 MG: 5 TABLET ORAL at 08:04

## 2021-01-31 RX ADMIN — DIVALPROEX SODIUM 750 MG: 500 TABLET, EXTENDED RELEASE ORAL at 20:17

## 2021-01-31 RX ADMIN — HALOPERIDOL 10 MG: 5 TABLET ORAL at 20:16

## 2021-01-31 RX ADMIN — TRAZODONE HYDROCHLORIDE 100 MG: 100 TABLET ORAL at 20:17

## 2021-01-31 RX ADMIN — MUPIROCIN: 20 OINTMENT TOPICAL at 08:05

## 2021-01-31 RX ADMIN — ARIPIPRAZOLE 5 MG: 5 TABLET ORAL at 08:04

## 2021-01-31 RX ADMIN — ATORVASTATIN CALCIUM 20 MG: 20 TABLET, FILM COATED ORAL at 20:18

## 2021-01-31 RX ADMIN — BENZTROPINE MESYLATE 2 MG: 1 TABLET ORAL at 20:17

## 2021-01-31 ASSESSMENT — PAIN SCALES - GENERAL
PAINLEVEL_OUTOF10: 2
PAINLEVEL_OUTOF10: 10
PAINLEVEL_OUTOF10: 8
PAINLEVEL_OUTOF10: 8

## 2021-01-31 NOTE — PROGRESS NOTES
Patient alert and oriented. His speech is rapid and difficult to understand. His movements are rapid. He is directible and cooperative. He is distracted by writing and coloring. He copies statements from magazines. Also wrote out a wedding invitation for his wedding to be held 6[birth year] in [address] [facility]. He currently denies SI/HI, auditory or visual hallucinations. Right great toe cleansed with soap and water. Non adherent dressing with mupirocin applied.

## 2021-01-31 NOTE — PLAN OF CARE
Problem: Anger Management/Homicidal Ideation:  Goal: Able to display appropriate communication and problem solving  Description: Able to display appropriate communication and problem solving  1/30/2021 2222 by Sanjay Dumont RN  Outcome: Ongoing  1/30/2021 0906 by Teresa Fierro RN  Outcome: Ongoing  Goal: Ability to verbalize frustrations and anger appropriately will improve  Description: Ability to verbalize frustrations and anger appropriately will improve  1/30/2021 2222 by Sanjay Dumont RN  Outcome: Ongoing  1/30/2021 0906 by Teresa Fierro RN  Outcome: Ongoing  Goal: Absence of angry outbursts  Description: Absence of angry outbursts  1/30/2021 2222 by Sanjay Dumont RN  Outcome: Ongoing  1/30/2021 0906 by Teresa Fierro RN  Outcome: Ongoing  Goal: Absence of homicidal ideation  Description: Absence of homicidal ideation  1/30/2021 2222 by Sanjay Dumont RN  Outcome: Ongoing  1/30/2021 0906 by Teresa Fierro RN  Outcome: Ongoing  Goal: Participates in care planning  Description: Participates in care planning  1/30/2021 2222 by Sanjay Dumont RN  Outcome: Ongoing  1/30/2021 0906 by Teresa Fierro RN  Outcome: Ongoing  Goal: Patient specific goal  Description: Patient specific goal  1/30/2021 2222 by Sanjay Dumont RN  Outcome: Ongoing  1/30/2021 0906 by Teresa Fierro RN  Outcome: Ongoing

## 2021-01-31 NOTE — BH NOTE
Alethea Mooney has been visible on the unit. He watches TV and writes or colors. He is approachable and cooperative with vitals. Medication reviewed, he voiced understanding and was compliant. His affect is bright and he is euphoric. He has not been a behavioral issue. He smiles and laughs. He denied all psych symptoms but continues to have two lights on in his room before he sleeps. His sleep is broken. It is noted that he sleeps until approximately 02:30 thinking that it is later in the morning. He will get up and have a snack then return to bed.

## 2021-01-31 NOTE — PLAN OF CARE
Problem: Anger Management/Homicidal Ideation:  Goal: Able to display appropriate communication and problem solving  Description: Able to display appropriate communication and problem solving  1/31/2021 1047 by Bari Ashby RN  Outcome: Ongoing  1/30/2021 2222 by Samuel Ayala RN  Outcome: Ongoing  Goal: Ability to verbalize frustrations and anger appropriately will improve  Description: Ability to verbalize frustrations and anger appropriately will improve  1/31/2021 1047 by Bari Ashby RN  Outcome: Ongoing  1/30/2021 2222 by Samuel Ayala RN  Outcome: Ongoing  Goal: Absence of angry outbursts  Description: Absence of angry outbursts  1/31/2021 1047 by Bari Ashby RN  Outcome: Ongoing  1/30/2021 2222 by Samuel Ayala RN  Outcome: Ongoing  Goal: Absence of homicidal ideation  Description: Absence of homicidal ideation  1/31/2021 1047 by Bari Ashby RN  Outcome: Ongoing  1/30/2021 2222 by Samuel Ayala RN  Outcome: Ongoing  Goal: Participates in care planning  Description: Participates in care planning  1/31/2021 1047 by Bari Ashby RN  Outcome: Ongoing  1/30/2021 2222 by Samuel Ayala RN  Outcome: Ongoing  Goal: Patient specific goal  Description: Patient specific goal  1/31/2021 1047 by Bari Ashby RN  Outcome: Ongoing  1/30/2021 2222 by Samuel Ayala RN  Outcome: Ongoing     Problem: Pain:  Goal: Pain level will decrease  Description: Pain level will decrease  1/31/2021 1047 by Bari Ashby RN  Outcome: Ongoing  1/30/2021 2222 by Samuel Ayala RN  Outcome: Ongoing  Goal: Control of acute pain  Description: Control of acute pain  1/31/2021 1047 by Bari Ashby RN  Outcome: Ongoing  1/30/2021 2222 by Samuel Ayala RN  Outcome: Ongoing  Goal: Control of chronic pain  Description: Control of chronic pain  1/31/2021 1047 by Bari Ashby RN  Outcome: Ongoing  1/30/2021 2222 by Samuel Ayala RN  Outcome: Ongoing

## 2021-01-31 NOTE — PROGRESS NOTES
Psychiatric Progress Note    Rebecca Bowser  [id number]  01/31/21    ID: Patient is a 61 yrs y.o. male     CC: I need to order things from [employer]beds and more. ..... Edwige Willis    HPI: Rebecca Bowser is a 61year old  single male with PMHx of schizoaffective disorder, paranoid type, COPD, GERD, arthritis,  and HLD. Patient was sent to SNF from [facility] 1/20/21, as he required wound care and oral antibiotics after podiatry debrided wound. Patient returned to hospital after leaving SNF AMA. SNF, per notes will not take him back due to behavioral issues. While at the hospital patient was placed on a psychiatric hold due to HI and delusions. Other notes sent with the patient report he has been \"grandiose, disorganized, pressured speech and responding to internal stimuli. \" Notes state he asked about Haldol dec but \"not having a shot in a long time.:\" Patient was transferred from [address] [place] [address] to [facility]. When he arrived he signed the voluntary form. Pt remains in agreement with treatment plan. Pt Continues to deny and side-effects to current medications. Pt was polite and cordal during the interview process. Has been taking medications and has been compliant with treatment. Tolerating medications without side effect complaints. Pt noted he is doing  \"really good today. \"  Pt noted he feels safe and comfortable on the unit. Pt was polite and cordial during the interview process. Currently he denies SI/HI. Pt denies any AV hallucinations yet continues to display bizarre delusions at times. He rates his depression as \"3\" on a scale of 0 to 10 with 0 being none and 10 being horrible. He rates his anxiety as \"0\" on the same scale. At times speech continues to be rapid and pressured  Yet reducing slowly. He states he was not able to sleep last night; per staff he slept 4-5 hours of broken sleep. He is oriented x 4    He states he was linked in the past with [facility] and would like to return to them.  He also states his Rx is in [address] [place] [address], [place] [address] off [address].    No Known Allergies    Medications Prior to Admission: sulfamethoxazole-trimethoprim (BACTRIM DS;SEPTRA DS) 800-160 MG per tablet, Take 1 tablet by mouth 2 times daily  atorvastatin (LIPITOR) 20 MG tablet, Take 20 mg by mouth daily  famotidine (PEPCID) 40 MG tablet, Take 40 mg by mouth daily At bedtime  ibuprofen (ADVIL;MOTRIN) 600 MG tablet, Take 600 mg by mouth every 8 hours as needed for Pain  albuterol sulfate HFA (VENTOLIN HFA) 108 (90 Base) MCG/ACT inhaler, Inhale 2 puffs into the lungs every 6 hours as needed for Wheezing  ARIPiprazole (ABILIFY) 30 MG tablet, Take 30 mg by mouth daily  meloxicam (MOBIC) 15 MG tablet, Take 15 mg by mouth daily as needed for Pain Indications: Adjunct to Opioid Analgesia for Moderate to Severe Pain  doxepin (SINEQUAN) 10 MG capsule, Take 10 mg by mouth nightly  Valbenazine Tosylate 80 MG CAPS, Take by mouth daily  mupirocin (BACTROBAN) 2 % ointment, Apply topically daily Apply topically 3 times daily.     Past Medical History:   Diagnosis Date    Arthritis     [facility]chronic obstructive [facility] disease) [facility]     Hyperlipidemia         Patient Active Problem List   Diagnosis    Schizoaffective disorder, bipolar type ([facility]       Review of Systems    OBJECTIVE  Vital Signs:  Vitals:    01/31/21 0730   BP: 119/63   Pulse: 81   Resp: 20   Temp: 97.9 °F (36.6 °C)   SpO2: 100%       Labs:  Recent Results (from the past 48 hour(s))   Basic Metabolic Panel w/ Reflex to MG    Collection Time: 01/30/21  2:00 PM   Result Value Ref Range    Sodium 134 (L) 135 - 145 MMOL/L    Potassium 4.4 3.5 - 5.1 MMOL/L    Chloride 97 (L) 99 - 110 mMol/L    CO2 31 21 - 32 MMOL/L    Anion Gap 6 4 - 16    BUN 13 6 - 23 MG/DL    CREATININE 1.0 0.9 - 1.3 MG/DL    Glucose 107 (H) 70 - 99 MG/DL    Calcium 9.4 8.3 - 10.6 MG/DL    GFR Non-African American >60 >60 mL/min/1.73m2    GFR African American >60 >60 mL/min/1.73m2       PSYCHIATRIC ASSESSMENT / MENTAL STATUS EXAM:   Vitals: Blood pressure 119/63, pulse 81, temperature 97.9 °F (36.6 °C), temperature source Temporal, resp. rate 20, height 5' 8\" (1.727 m), weight 153 lb 14.1 oz (69.8 kg), SpO2 100 %. CONSTITUTIONAL:      Appearance: appears stated age. alert and oriented to person, place, time & situation. no acute distress. Adequate grooming and hygeine. Good eye contact. No prominent physical abnormalities. Attitude: Manner is cooperative and pleasant but with boughts of irritability  Motor: No psychomotor agitation, retardation or abnormal movements noted  Speech: Pressured and difficult to interrupt, Normal volume, tone &  Increased amount. Language: intact understanding and production  Mood: depressed  Affect: euthymic, full range, non-labile, congruent with mood and content of speech  Thought Production: Spontaneous. Thought Form: Coherent, linear, logical & goal-directed. No tangentiality or circumstantiality. No flight of ideas or loosening of associations. Thought Content/Perceptions: No JANE, no AVH, positive delusions and paranoia  Insight: limited  Judgment- limited  Memory: Immediate, recent, and remote appear intact, though not formally tested. Attention: maintained throughout interview  Fund of knowledge: Average  Gait/Balance: WNL/WNL         Impression:   Schizoaffective, currently depressed and paranoid     Problem List:   Schizoaffective disorder, bipolar type ([facility]         PLAN:  Psychiatric management:medication initiation and titration, group and individual therapy, safe and theraputic environment. Status of problem/condition: ?Improving  Medical co-morbidities: [facility], appreciate assistance  Legal Status:Pending  Disposition:estimated LOS: Pending. The treatment team reviewed with the patient the diagnosis and treatment recommendations to include the risks, benefits, and side effects of chosen medications.   The patient verbalized understanding and agreed with the treatment regimen as outlined above. The patient was encouraged to participate in groups. Medical records, Labs, Diagnotic tests reviewed  q15 min safety checks for safety  Interval History. Review current labs   Supportive Therapy Provided  Pt had an opportunity to ask questions and address concerns  Pt encouraged to continue therapy group or individual.  Pt was in agreement with treatment plan. The risks benefits and side effects of medications were discussed with the patient, including alternatives and no treatment.     Electronically signed by Nay Esteves DO on 1/31/2021 at 5:18 PM

## 2021-02-01 PROCEDURE — 6370000000 HC RX 637 (ALT 250 FOR IP): Performed by: PSYCHIATRY & NEUROLOGY

## 2021-02-01 PROCEDURE — 99232 SBSQ HOSP IP/OBS MODERATE 35: CPT | Performed by: NURSE PRACTITIONER

## 2021-02-01 PROCEDURE — 1240000000 HC EMOTIONAL WELLNESS R&B

## 2021-02-01 PROCEDURE — 6370000000 HC RX 637 (ALT 250 FOR IP): Performed by: NURSE PRACTITIONER

## 2021-02-01 RX ADMIN — ACETAMINOPHEN 650 MG: 325 TABLET ORAL at 17:15

## 2021-02-01 RX ADMIN — BENZTROPINE MESYLATE 2 MG: 1 TABLET ORAL at 20:58

## 2021-02-01 RX ADMIN — HALOPERIDOL 10 MG: 5 TABLET ORAL at 08:37

## 2021-02-01 RX ADMIN — DIVALPROEX SODIUM 750 MG: 500 TABLET, EXTENDED RELEASE ORAL at 20:58

## 2021-02-01 RX ADMIN — TRAZODONE HYDROCHLORIDE 100 MG: 100 TABLET ORAL at 20:58

## 2021-02-01 RX ADMIN — ATORVASTATIN CALCIUM 20 MG: 20 TABLET, FILM COATED ORAL at 20:58

## 2021-02-01 RX ADMIN — MUPIROCIN: 20 OINTMENT TOPICAL at 10:12

## 2021-02-01 RX ADMIN — HALOPERIDOL 10 MG: 5 TABLET ORAL at 20:58

## 2021-02-01 RX ADMIN — ACETAMINOPHEN 650 MG: 325 TABLET ORAL at 12:48

## 2021-02-01 ASSESSMENT — PAIN - FUNCTIONAL ASSESSMENT
PAIN_FUNCTIONAL_ASSESSMENT: ACTIVITIES ARE NOT PREVENTED
PAIN_FUNCTIONAL_ASSESSMENT: ACTIVITIES ARE NOT PREVENTED

## 2021-02-01 ASSESSMENT — PAIN DESCRIPTION - PAIN TYPE
TYPE: ACUTE PAIN
TYPE: CHRONIC PAIN
TYPE: ACUTE PAIN
TYPE: CHRONIC PAIN

## 2021-02-01 ASSESSMENT — PAIN DESCRIPTION - DESCRIPTORS
DESCRIPTORS: ACHING
DESCRIPTORS: DISCOMFORT;ACHING
DESCRIPTORS: ACHING

## 2021-02-01 ASSESSMENT — PAIN DESCRIPTION - FREQUENCY
FREQUENCY: INTERMITTENT
FREQUENCY: INTERMITTENT

## 2021-02-01 ASSESSMENT — PAIN DESCRIPTION - ORIENTATION
ORIENTATION: RIGHT;LEFT
ORIENTATION: LOWER

## 2021-02-01 ASSESSMENT — PAIN DESCRIPTION - ONSET
ONSET: ON-GOING

## 2021-02-01 ASSESSMENT — PAIN SCALES - GENERAL: PAINLEVEL_OUTOF10: 8

## 2021-02-01 NOTE — BH NOTE
Italia Saldana spent time on the milieu. He was coloring and drawing. He was polite on approach and cooperative with vitals. Medication was discussed with him including new medication and increased dosage of current medication. He voiced understanding and was compliant. He continues to have a bright affect, rambling speech and is hyperverbal. He is difficult to understand at times. He denied all psych symptoms. He has not been a behavioral issue.

## 2021-02-01 NOTE — GROUP NOTE
Group Therapy Note    Date: 2/1/2021    Group Start Time: 1410  Group End Time: 1440  Group Topic: Psychotherapy    [address] [facility]    PARAMJIT Brasher        Group Therapy Note    Attendees: 6/8         Notes:   Patient participated in an exercise on gratitude in group. Status After Intervention:  Improved    Participation Level:  Active Listener, Interactive and Monopolizing    Participation Quality: Attentive, Sharing and Supportive      Speech:  pressured      Thought Process/Content: Logical      Affective Functioning: Congruent      Mood: euthymic      Level of consciousness:  Alert and Attentive      Response to Learning: Able to verbalize current knowledge/experience      Endings: None Reported    Modes of Intervention: Education, Support, Socialization and Exploration      Discipline Responsible: /Counselor      Signature:  PARAMJIT Suazo

## 2021-02-01 NOTE — PLAN OF CARE
[facility]  Day 3 Interdisciplinary Treatment Plan NOTE    Review Date & Time: 02/01 0830    Admission Type:   Admission Type:  Involuntary    Reason for admission:  Reason for Admission: Acute schizophrenia    Patient Diagnosis : schizoaffective disorder, bipolar type    PATIENT STRENGTHS:  Patient Strengths Strengths: (TIARA)  Patient Strengths and Limitations:Limitations: Multiple barriers to leisure interests, Difficult relationships / poor social skills  Addictive Behavior:Addictive Behavior  In the past 3 months, have you felt or has someone told you that you have a problem with:  : (admits to many addictive behaviors)  Do you have a history of Chemical Use?: Yes  Do you have a history of Alcohol Use?: Yes  Do you have a history of Street Drug Abuse?: Yes  Histroy of Prescripton Drug Abuse?: Yes  Medical Problems:  Past Medical History:   Diagnosis Date    Arthritis     COPD (chronic obstructive pulmonary disease) ([facility]     Hyperlipidemia        Risk:  Fall Risk Total: 55  Momo Scale Momo Scale Score: 23  BVC Total: 0      Status EXAM:   Status and Exam  Normal: No  Facial Expression: Brightened, Elevated  Affect: Appropriate  Level of Consciousness: Alert  Mood:Normal: Yes  Mood: Euphoric  Motor Activity:Normal: Yes  Motor Activity: Increased  Interview Behavior: Cooperative, Impulsive  Preception: [place] to Person, [place] to Time, [place] to Place, [place] to Situation  Attention:Normal: No  Attention: Distractible  Thought Processes: Flt.of Ideas  Thought Content:Normal: No  Thought Content: Preoccupations  Hallucinations: None  Delusions: No  Memory:Normal: Yes  Insight and Judgment: No  Insight and Judgment: Poor Judgment, Poor Insight  Present Suicidal Ideation: No  Present Homicidal Ideation: No    Daily Assessment Last Entry:   Daily Sleep (WDL): Within Defined Limits  Patient Currently in Pain: No  Daily Nutrition (WDL): Within Defined Limits  Appetite Change: Normal for patient  Barriers to Nutrition: None  Level of Assistance: Independent/Self    Patient Monitoring:  Frequency of Checks: 4 times per hour, close    Psychiatric Symptoms:   Depression Symptoms  Depression Symptoms: Sleep disturbance, Impaired concentration  Anxiety Symptoms  Anxiety Symptoms: Obsessions  Estee Symptoms  Estee Symptoms: Flight of ideas, Increased energy, Less need to sleep     Psychosis Symptoms  Delusion Type: No problems reported or observed.     Suicide Risk CSSR-S:  1) Within the past month, have you wished you were dead or wished you could go to sleep and not wake up? : No  2) Have you actually had any thoughts of killing yourself? : No  6) Have you ever done anything, started to do anything, or prepared to do anything to end your life?: No        EDUCATION:   Learner Progress Toward Treatment Goals: Reviewed results and recommendations of this team    Method: Group    Outcome: Verbalized understanding    PATIENT GOALS: med titration    PLAN/TREATMENT RECOMMENDATIONS UPDATE: med titration    Estimated Length of Stay Update:  8 days  Estimated Discharge Date Update: 2/4/21  Discharge criteria: med titration, compliance with unit programming      SHORT-TERM GOALS UPDATE:   Time frame for Short-Term Goals: 5 days    LONG-TERM GOALS UPDATE:   Time frame for Long-Term Goals: 8 days  Members Present in Team Meeting: See Signature Sheet    PARAMJIT Wesley

## 2021-02-01 NOTE — GROUP NOTE
Group Therapy Note    Date: 2/1/2021    Group Start Time: 0830  Group End Time: 0930    Number of Participants: 7/7    Type: Morning Goals Group/ Community Meeting    Group Topic/Objective: Set Goal For The Day and to review Unit Rules and Regulations. Patient's Goal:  Pt states his goal is \"Fill out all this stuff. \" r/t blank papers he is writing on. Notes:  Pt presented with rapid, tangential speech and flight of ideas. Pt was noted to talk to himself less when not directly addressed. Pt required Mod prompting to focus on the question being asked. Pt states he is feeling \"in the middle\" and is grateful \"not out in this stuff. \" r/t snow outside. PT reports restful sleep of ~6 hours. Depression (0-10): 3    Anxiety (0-10): 0    Irritability/Aggitation (0-10): 0    Status After Intervention:  Improved    Participation Level:  Active Listener and Interactive    Participation Quality: Attentive and Sharing    Speech:  pressured and rapid    Thought Process/Content: Flight of ideas    Affective Functioning: Congruent    Mood: Bright    Level of consciousness:  Alert and Attentive    Response to Learning: Able to verbalize current knowledge/experience    Endings: None Reported    Modes of Intervention: Education, Support and Socialization    Discipline Responsible: Certified Therapeutic Recreation Specialist     Electronically signed by Ellyn Meckel, MA on 2/1/2021 at 9:39 AM

## 2021-02-01 NOTE — PLAN OF CARE
Problem: Anger Management/Homicidal Ideation:  Goal: Able to display appropriate communication and problem solving  Description: Able to display appropriate communication and problem solving  1/31/2021 2127 by Skyler Cantrell RN  Outcome: Ongoing  1/31/2021 1047 by Jodi Hi RN  Outcome: Ongoing  Goal: Ability to verbalize frustrations and anger appropriately will improve  Description: Ability to verbalize frustrations and anger appropriately will improve  1/31/2021 2127 by Skyler Cantrell RN  Outcome: Ongoing  1/31/2021 1047 by Jodi Hi RN  Outcome: Ongoing  Goal: Absence of angry outbursts  Description: Absence of angry outbursts  1/31/2021 2127 by Skyler Cantrell RN  Outcome: Met This Shift  1/31/2021 1047 by Jodi Hi RN  Outcome: Ongoing  Goal: Absence of homicidal ideation  Description: Absence of homicidal ideation  1/31/2021 2127 by Skyler Cantrell RN  Outcome: Met This Shift  1/31/2021 1047 by Jodi Hi RN  Outcome: Ongoing  Goal: Participates in care planning  Description: Participates in care planning  1/31/2021 2127 by Skyler Cantrell RN  Outcome: Ongoing  1/31/2021 1047 by Jodi Hi RN  Outcome: Ongoing  Goal: Patient specific goal  Description: Patient specific goal  1/31/2021 2127 by Skyler Cantrell RN  Outcome: Ongoing  1/31/2021 1047 by Jodi Hi RN  Outcome: Ongoing

## 2021-02-01 NOTE — GROUP NOTE
Group Therapy Note    Date: 2/1/2021    Group Start Time: 1530  Group End Time: 1600    Number of Participants: 5/8    Type: Exercise/Recreation Group    Group Topic/Objective: Chair Exercises    Notes:  Pt participated actively in the group. Pt noted to have less talking to self and was better able to focus on the exercises. Status After Intervention:  Improved    Participation Level:  Active Listener and Interactive    Participation Quality: Appropriate and Attentive    Speech:  normal    Thought Process/Content: Logical    Affective Functioning: Blunted    Mood: Blunted    Level of consciousness:  Alert and Attentive    Response to Learning: Able to verbalize current knowledge/experience and Able to verbalize/acknowledge new learning    Endings: None Reported    Modes of Intervention: Activity and Movement    Discipline Responsible: Certified Therapeutic Recreation Specialist     Electronically signed by Colton Ayers MA on 2/2/2021 at 1:34 PM

## 2021-02-01 NOTE — PROGRESS NOTES
Alondra Hernández was pleasant in the dining area this morning upon approach. He was polite and was cooperative with medications. He has rambled speech and flight of ideas at times. He states he slept about 6 hours last night and that he slept pretty well. Alondra Hernández spent most of the day in the dining area coloring and had obsessive behaviors of coloring and writing down all of his important information and getting it photocopied.

## 2021-02-01 NOTE — PROGRESS NOTES
Psychiatric Progress Note    Rito Nolan  [id number]  02/01/21    ID: Patient is a 61 yrs y.o. male     CC: I need to order things from waterbeds and more. ..... Tricia Redd    HPI: Rito Nolan is a 61year old  single male with PMHx of schizoaffective disorder, paranoid type, COPD, GERD, arthritis,  and HLD. Patient was sent to SNF from [facility] 1/20/21, as he required wound care and oral antibiotics after podiatry debrided wound. Patient returned to hospital after leaving SNF AMA. SNF, per notes will not take him back due to behavioral issues. While at the hospital patient was placed on a psychiatric hold due to HI and delusions. Other notes sent with the patient report he has been \"grandiose, disorganized, pressured speech and responding to internal stimuli. \" Notes state he asked about Haldol dec but \"not having a shot in a long time.:\" Patient was transferred from [address] [place] [address] to [facility]. When he arrived he signed the voluntary form. Pt remains in agreement with treatment plan. Pt Continues to deny and side-effects to current medications. Pt was polite and cordal during the interview process. Has been taking medications and has been compliant with treatment. Tolerating medications without side effect complaints. Pt noted he is doing  \"really good today. \"  Pt noted he feels safe and comfortable on the unit. Currently he denies SI/HI. Pt denies any AV hallucinations yet continues to display bizarre delusions at times. He rates his depression as \"2\" on a scale of 0 to 10 with 0 being none and 10 being horrible. He rates his anxiety as \"0\" on the same scale. Speech is becoming less be rapid and pressured, though remains garbled. He is not noted to be talking to himself as much by nursing staff. He reports sleeping 6 hours last night and feels rested. He is oriented x 4    He states he was linked in the past with [facility] and would like to return to them.  He also states his Rx is in [facility] [address].     Notes from [facility] of psychiatry in [facility] [place]) reviewed. Patient was placed on a LARSEN Abilify Maintena 4 mg IM q monthly- his last dose was 1/13/2021. He received Abilify 30 mg qhs for 13 days prior to [address] [place] [address]. No Known Allergies    Medications Prior to Admission: sulfamethoxazole-trimethoprim (BACTRIM DS;SEPTRA DS) 800-160 MG per tablet, Take 1 tablet by mouth 2 times daily  atorvastatin (LIPITOR) 20 MG tablet, Take 20 mg by mouth daily  famotidine (PEPCID) 40 MG tablet, Take 40 mg by mouth daily At bedtime  ibuprofen (ADVIL;MOTRIN) 600 MG tablet, Take 600 mg by mouth every 8 hours as needed for Pain  albuterol sulfate HFA (VENTOLIN HFA) 108 (90 Base) MCG/ACT inhaler, Inhale 2 puffs into the lungs every 6 hours as needed for Wheezing  ARIPiprazole (ABILIFY) 30 MG tablet, Take 30 mg by mouth daily  meloxicam (MOBIC) 15 MG tablet, Take 15 mg by mouth daily as needed for Pain Indications: Adjunct to Opioid Analgesia for Moderate to Severe Pain  doxepin (SINEQUAN) 10 MG capsule, Take 10 mg by mouth nightly  Valbenazine Tosylate 80 MG CAPS, Take by mouth daily  mupirocin (BACTROBAN) 2 % ointment, Apply topically daily Apply topically 3 times daily.     Past Medical History:   Diagnosis Date    Arthritis     [facility]chronic obstructive [facility] disease) [facility]     Hyperlipidemia         Patient Active Problem List   Diagnosis    Schizoaffective disorder, bipolar type ([facility]       Review of Systems    OBJECTIVE  Vital Signs:  Vitals:    02/01/21 0818   BP: (!) 109/57   Pulse: 88   Resp: 18   Temp: 97.6 °F (36.4 °C)   SpO2: 99%       Labs:  Recent Results (from the past 48 hour(s))   Basic Metabolic Panel w/ Reflex to MG    Collection Time: 01/30/21  2:00 PM   Result Value Ref Range    Sodium 134 (L) 135 - 145 MMOL/L    Potassium 4.4 3.5 - 5.1 MMOL/L    Chloride 97 (L) 99 - 110 mMol/L    CO2 31 21 - 32 MMOL/L    Anion Gap 6 4 - 16    BUN 13 6 - 23 MG/DL    CREATININE 1.0 0.9 - 1.3 MG/DL    Glucose 107 (H) 70 - 99 MG/DL    Calcium 9.4 8.3 - 10.6 MG/DL    GFR Non-African American >60 >60 mL/min/1.73m2    GFR African American >60 >60 mL/min/1.73m2       PSYCHIATRIC ASSESSMENT / MENTAL STATUS EXAM:   Vitals: Blood pressure (!) 109/57, pulse 88, temperature 97.6 °F (36.4 °C), temperature source Temporal, resp. rate 18, height 5' 8\" (1.727 m), weight 153 lb 14.1 oz (69.8 kg), SpO2 99 %. CONSTITUTIONAL:      Appearance: appears stated age. alert and oriented to person, place, time & situation. no acute distress. Dishelved. Good eye contact. No prominent physical abnormalities. Attitude: Manner is cooperative and pleasant. No irritability noted  Motor: No psychomotor agitation, retardation or abnormal movements noted  Speech: Pressured and difficult to interrupt, garbled, normal volume, tone &  Increased amount. Language: intact understanding and production  Mood: \"I'm fine\"  Affect: euthymic, full range, non-labile, congruent with mood and content of speech  Thought Production: Spontaneous. Thought Form: Coherent, linear, logical & goal-directed. Circumstantiality noted. No tangentiality. No flight of ideas or loosening of associations. Thought Content/Perceptions: No JANE, no AVH, positive delusions and paranoia  Insight: limited  Judgment- limited  Memory: Immediate, recent, and remote appear intact, though not formally tested. Attention: maintained throughout interview  Fund of knowledge: Average  Gait/Balance: WNL/WNL         Impression:   Schizoaffective, currently depressed and paranoid     Problem List:   Schizoaffective disorder, bipolar type ([facility]         PLAN:  Psychiatric management:medication initiation and titration, group and individual therapy, safe and theraputic environment. Status of problem/condition: ?Improving  Medical co-morbidities: [facility], appreciate assistance  Legal Status:Voluntary  Disposition:estimated LOS: Pending.   The

## 2021-02-01 NOTE — GROUP NOTE
Group Therapy Note    Date: 2/1/2021    Group Start Time: 5609  Group End Time: 1130  Group Topic: Psychoeducation    [address]    [place] Vaz[place], [place]        Group Therapy Note    Attendees: 4/7       Notes:  Pts participated in recreational therapy group; School Days. Pts and therapist utilized various activities to reminisce about their past and share stories. Purpose was to encourage positive thinking and socialization. Pt encouraged to attend, pt refused.        Discipline Responsible: Certified Therapeutic Recreation Specialist       Signature:  Colton Vazquez, [address] [place], [place] [address]

## 2021-02-02 LAB
ANION GAP SERPL CALCULATED.3IONS-SCNC: 4 MMOL/L (ref 4–16)
BUN BLDV-MCNC: 11 MG/DL (ref 6–23)
CALCIUM SERPL-MCNC: 9.7 MG/DL (ref 8.3–10.6)
CHLORIDE BLD-SCNC: 100 MMOL/L (ref 99–110)
CO2: 33 MMOL/L (ref 21–32)
CREAT SERPL-MCNC: 0.8 MG/DL (ref 0.9–1.3)
GFR AFRICAN AMERICAN: >60 ML/MIN/1.73M2
GFR NON-AFRICAN AMERICAN: >60 ML/MIN/1.73M2
GLUCOSE BLD-MCNC: 98 MG/DL (ref 70–99)
POTASSIUM SERPL-SCNC: 4.7 MMOL/L (ref 3.5–5.1)
SODIUM BLD-SCNC: 137 MMOL/L (ref 135–145)

## 2021-02-02 PROCEDURE — 1240000000 HC EMOTIONAL WELLNESS R&B

## 2021-02-02 PROCEDURE — 80048 BASIC METABOLIC PNL TOTAL CA: CPT

## 2021-02-02 PROCEDURE — 99232 SBSQ HOSP IP/OBS MODERATE 35: CPT | Performed by: NURSE PRACTITIONER

## 2021-02-02 PROCEDURE — 6370000000 HC RX 637 (ALT 250 FOR IP): Performed by: NURSE PRACTITIONER

## 2021-02-02 PROCEDURE — 6370000000 HC RX 637 (ALT 250 FOR IP): Performed by: PSYCHIATRY & NEUROLOGY

## 2021-02-02 PROCEDURE — 36415 COLL VENOUS BLD VENIPUNCTURE: CPT

## 2021-02-02 RX ORDER — HALOPERIDOL DECANOATE 100 MG/ML
150 INJECTION INTRAMUSCULAR
Status: DISCONTINUED | OUTPATIENT
Start: 2021-02-03 | End: 2021-02-08 | Stop reason: HOSPADM

## 2021-02-02 RX ORDER — HALOPERIDOL 5 MG
5 TABLET ORAL NIGHTLY
Status: DISCONTINUED | OUTPATIENT
Start: 2021-02-03 | End: 2021-02-08 | Stop reason: HOSPADM

## 2021-02-02 RX ADMIN — ATORVASTATIN CALCIUM 20 MG: 20 TABLET, FILM COATED ORAL at 20:32

## 2021-02-02 RX ADMIN — DIVALPROEX SODIUM 750 MG: 500 TABLET, EXTENDED RELEASE ORAL at 20:32

## 2021-02-02 RX ADMIN — HALOPERIDOL 10 MG: 5 TABLET ORAL at 10:30

## 2021-02-02 RX ADMIN — BENZTROPINE MESYLATE 2 MG: 1 TABLET ORAL at 20:32

## 2021-02-02 RX ADMIN — IBUPROFEN 600 MG: 600 TABLET, FILM COATED ORAL at 21:52

## 2021-02-02 RX ADMIN — TRAZODONE HYDROCHLORIDE 100 MG: 100 TABLET ORAL at 20:32

## 2021-02-02 RX ADMIN — MUPIROCIN: 20 OINTMENT TOPICAL at 10:30

## 2021-02-02 RX ADMIN — LORAZEPAM 1 MG: 1 TABLET ORAL at 21:51

## 2021-02-02 ASSESSMENT — PAIN SCALES - GENERAL
PAINLEVEL_OUTOF10: 6
PAINLEVEL_OUTOF10: 0

## 2021-02-02 NOTE — GROUP NOTE
Group Therapy Note    Date: 2/2/2021    Group Start Time: 3325  Group End Time: 1600    Number of Participants: 7/8    Type: Exercise/Recreation Group    Group Topic/Objective: Chair Exercises     Notes:  Pt attended group, but refused to participate.      Status After Intervention:  Unchanged    Participation Level: None    Participation Quality: Resistant    Speech:  pressured    Thought Process/Content: Flight of ideas    Affective Functioning: Congruent    Mood: irritable    Level of consciousness:  Inattentive    Response to Learning: Resistant    Endings: None Reported    Modes of Intervention: Activity and Movement    Discipline Responsible: Certified Therapeutic Recreation Specialist     Electronically signed by Valentino Knock, CTRS, MA on 2/3/2021 at 10:06 AM

## 2021-02-02 NOTE — GROUP NOTE
Group Therapy Note    Date: 2/2/2021    Group Start Time: 0830  Group End Time: 0900    Number of Participants: 6/8    Type: Morning Goals Group/ Community Meeting    Group Topic/Objective: Set Goal For The Day and to review Unit Rules and Regulations. Patient's Goal:  Pt states his goal is \"Fill out this paperwork. \" r/t blank pages he is using to write stuff from magazines on. Notes:  Pt presented with rapid, tangential speech and flight of ideas. Pt states he is feeling \"fine\" and is grateful for \"myself. \"  Pt reports restful sleep of ~6 hours. Depression (0-10): 2-3    Anxiety (0-10): 0    Irritability/Aggitation (0-10): 0    Status After Intervention:  Improved    Participation Level:  Active Listener and Interactive    Participation Quality: Sharing    Speech:  normal    Thought Process/Content: Flight of ideas    Affective Functioning: Exaggerated    Mood: elevated    Level of consciousness:  Preoccupied    Response to Learning: Able to verbalize current knowledge/experience    Endings: None Reported    Modes of Intervention: Education, Support and Socialization    Discipline Responsible: Certified Therapeutic Recreation Specialist     Electronically signed by Zaynab Palomino on 2/2/2021 at 9:55 AM

## 2021-02-02 NOTE — GROUP NOTE
Group Therapy Note    Date: 2/2/2021    Group Start Time: 5520  Group End Time: 1991  Group Topic: Recreational    [address] [place], [place]        Group Therapy Note    Attendees: 6/8       Notes:  Pts participated in recreational therapy group; Music Listenting Activity. Pts were encouraged to engage in a leisure activity to promote socialization, positive mood, and coping with negative emotions. Pt arrived ~20 minutes late to group. Pt required Mod prompting to not speak over peers and pressure them to pick songs he wanted. Pt expressed enjoyment in the group.      Status After Intervention:  Improved    Participation Level: Interactive    Participation Quality: Intrusive      Speech:  Pressured, rapid      Thought Process/Content: Flight of ideas      Affective Functioning: Congruent      Mood: Bright      Level of consciousness:  Alert and Attentive      Response to Learning: Able to verbalize current knowledge/experience and Able to verbalize/acknowledge new learning      Endings: None Reported    Modes of Intervention: Socialization and Activity      Discipline Responsible: Certified Therapeutic Recreation Specialist       Signature:  Ezequiel Martinez, [address] [place] [address]

## 2021-02-02 NOTE — PLAN OF CARE
Problem: Anger Management/Homicidal Ideation:  Goal: Able to display appropriate communication and problem solving  Description: Able to display appropriate communication and problem solving  Outcome: Ongoing  Goal: Ability to verbalize frustrations and anger appropriately will improve  Description: Ability to verbalize frustrations and anger appropriately will improve  Outcome: Ongoing  Goal: Absence of angry outbursts  Description: Absence of angry outbursts  Outcome: Met This Shift  Goal: Absence of homicidal ideation  Description: Absence of homicidal ideation  Outcome: Met This Shift  Goal: Participates in care planning  Description: Participates in care planning  Outcome: Ongoing  Goal: Patient specific goal  Description: Patient specific goal  Outcome: Ongoing

## 2021-02-02 NOTE — PLAN OF CARE
Problem: Anger Management/Homicidal Ideation:  Goal: Able to display appropriate communication and problem solving  Description: Able to display appropriate communication and problem solving  Outcome: Ongoing  Goal: Ability to verbalize frustrations and anger appropriately will improve  Description: Ability to verbalize frustrations and anger appropriately will improve  Outcome: Ongoing  Goal: Absence of angry outbursts  Description: Absence of angry outbursts  Outcome: Ongoing  Goal: Absence of homicidal ideation  Description: Absence of homicidal ideation  Outcome: Ongoing  Goal: Participates in care planning  Description: Participates in care planning  Outcome: Ongoing  Goal: Patient specific goal  Description: Patient specific goal  Outcome: Ongoing     Problem: Pain:  Goal: Pain level will decrease  Description: Pain level will decrease  Outcome: Ongoing  Goal: Control of acute pain  Description: Control of acute pain  Outcome: Ongoing  Goal: Control of chronic pain  Description: Control of chronic pain  Outcome: Ongoing

## 2021-02-02 NOTE — PROGRESS NOTES
Psychiatric Progress Note    Shruthi Almaraz  [id number]  02/02/21    ID: Patient is a 61 yrs y.o. male     CC: I need to order things from waterbeds and more. ..... Ondina Javed    HPI: Shruthi Almaraz is a 61year old  single male with PMHx of schizoaffective disorder, paranoid type, COPD, GERD, arthritis,  and HLD. Patient was sent to SNF from [facility] 1/20/21, as he required wound care and oral antibiotics after podiatry debrided wound. Patient returned to hospital after leaving SNF AMA. SNF, per notes will not take him back due to behavioral issues. While at the hospital patient was placed on a psychiatric hold due to HI and delusions. Other notes sent with the patient report he has been \"grandiose, disorganized, pressured speech and responding to internal stimuli. \" Notes state he asked about Haldol dec but \"not having a shot in a long time.:\" Patient was transferred from [address] [place] [address] to [facility]. When he arrived he signed the voluntary form. Pt remains in agreement with treatment plan. Pt continues to deny and side-effects to current medications. He reports EPS is controlled with cogentin. Pt was polite and cordal during the interview process. Has been taking medications and has been compliant with treatment. Pt noted he is doing  \"really good today. \"  Pt noted he feels safe and comfortable on the unit. Currently he denies SI/HI. Pt denies any AV hallucinations yet continues to display bizarre delusions at times. He rates his depression as \"3\" on a scale of 0 to 10 with 0 being none and 10 being horrible. He rates his anxiety as \"0\" on the same scale. Speech is becoming less  rapid and pressured, though remains garbled. He is not noted to be talking to himself as much by nursing staff. When observing patient coloring on unit, he is driven in actions. He reports sleeping 6 hours last night and feels rested.  He is oriented x 4    He states he was linked in the past with [facility] and would like to return to them. He also states his Rx is in [address] [place] [address], [facility] [address] [employer].     Notes from [facility] of psychiatry in [facility]) reviewed. Patient was placed on a LARSEN Abilify Maintena 4 mg IM q monthly- his last dose was 1/13/2021. He received Abilify 30 mg qhs for 13 days prior to [address] [place] [address]. No Known Allergies    Medications Prior to Admission: sulfamethoxazole-trimethoprim (BACTRIM DS;SEPTRA DS) 800-160 MG per tablet, Take 1 tablet by mouth 2 times daily  atorvastatin (LIPITOR) 20 MG tablet, Take 20 mg by mouth daily  famotidine (PEPCID) 40 MG tablet, Take 40 mg by mouth daily At bedtime  ibuprofen (ADVIL;MOTRIN) 600 MG tablet, Take 600 mg by mouth every 8 hours as needed for Pain  albuterol sulfate HFA (VENTOLIN HFA) 108 (90 Base) MCG/ACT inhaler, Inhale 2 puffs into the lungs every 6 hours as needed for Wheezing  ARIPiprazole (ABILIFY) 30 MG tablet, Take 30 mg by mouth daily  meloxicam (MOBIC) 15 MG tablet, Take 15 mg by mouth daily as needed for Pain Indications: Adjunct to Opioid Analgesia for Moderate to Severe Pain  doxepin (SINEQUAN) 10 MG capsule, Take 10 mg by mouth nightly  Valbenazine Tosylate 80 MG CAPS, Take by mouth daily  mupirocin (BACTROBAN) 2 % ointment, Apply topically daily Apply topically 3 times daily.     Past Medical History:   Diagnosis Date    Arthritis     [facility]chronic [facility]) [facility]     Hyperlipidemia         Patient Active Problem List   Diagnosis    Schizoaffective disorder, bipolar type ([facility] [facility]       Review of Systems    OBJECTIVE  Vital Signs:  Vitals:    02/02/21 0830   BP: 135/82   Pulse: 84   Resp: 18   Temp: 97.4 °F (36.3 °C)   SpO2: 100%       Labs:  Recent Results (from the past 48 hour(s))   Basic Metabolic Panel w/ Reflex to MG    Collection Time: 02/02/21  7:00 AM   Result Value Ref Range    Sodium 137 135 - 145 MMOL/L    Potassium 4.7 3.5 - 5.1 MMOL/L    Chloride 100 99 - 110 mMol/L    CO2 33 (H) 21 - 32 MMOL/L    Anion Gap 4 4 - 16    BUN 11 6 - 23 MG/DL    CREATININE 0.8 (L) 0.9 - 1.3 MG/DL    Glucose 98 70 - 99 MG/DL    Calcium 9.7 8.3 - 10.6 MG/DL    GFR Non-African American >60 >60 mL/min/1.73m2    GFR African American >60 >60 mL/min/1.73m2       PSYCHIATRIC ASSESSMENT / MENTAL STATUS EXAM:   Vitals: Blood pressure 135/82, pulse 84, temperature 97.4 °F (36.3 °C), temperature source Temporal, resp. rate 18, height 5' 8\" (1.727 m), weight 153 lb 14.1 oz (69.8 kg), SpO2 100 %. CONSTITUTIONAL:      Appearance: appears stated age. alert and oriented to person, place, time & situation. no acute distress. Dishelved. Good eye contact. No prominent physical abnormalities. Attitude: Manner is cooperative and pleasant. No irritability noted  Motor: No psychomotor agitation, retardation or abnormal movements noted  Speech: Pressured, garbled, normal volume, tone &  Increased amount. Language: intact understanding and production  Mood: \"I'm fine\"  Affect: euthymic, full range, non-labile, congruent with mood and content of speech  Thought Production: Spontaneous. Thought Form: Coherent, linear, logical & goal-directed. Circumstantiality noted. No tangentiality. No flight of ideas or loosening of associations. Thought Content/Perceptions: No JANE, no AVH, positive delusions and paranoia  Insight: limited  Judgment- limited  Memory: Immediate, recent, and remote appear intact, though not formally tested. Attention: maintained throughout interview  Fund of knowledge: Average  Gait/Balance: WNL/WNL         Impression:   Schizoaffective, currently depressed and paranoid     Problem List:   Schizoaffective disorder, bipolar type ([facility] [address]         PLAN:  Psychiatric management:medication initiation and titration, group and individual therapy, safe and theraputic environment.   Status of problem/condition: ?Improving  Medical co-morbidities: [facility], appreciate assistance  Legal Status:Voluntary  Disposition:estimated LOS: Pending. The treatment team reviewed with the patient the diagnosis and treatment recommendations to include the risks, benefits, and side effects of chosen medications. The patient verbalized understanding and agreed with the treatment regimen as outlined above. The patient was encouraged to participate in groups. Medical records, Labs, Diagnotic tests reviewed  q15 min safety checks for safety  Interval History. Review current labs   Medication - benztropine 2 mg PO h.s., Depakote  mg PO daily, Haldol 10 mg PO BID, trazodone 100 mg PO h.s. Supportive Therapy Provided  Pt had an opportunity to ask questions and address concerns  Pt encouraged to continue therapy group or individual.  Pt was in agreement with treatment plan. The risks benefits and side effects of medications were discussed with the patient, including alternatives and no treatment.     Electronically signed by GARCIA Garcia CNP on 2/2/2021 at 11:38 AM

## 2021-02-02 NOTE — BH NOTE
Patient has been up on the unit this shift, isolative to self, sitting at a table coloring and making his own notes. He has been cooperative with assessment, medications and vital signs. No overt distress noted, however is noted to have a flat affect. He is noted to have a difficult level of speech to understand at times with a mumble. He was noted to become agitated twice, when he was not allowed to use the phone due to group or request to call social security. He becomes loud for a brief period of time and then quickly calms. Ambulates independently and has remained free from falls. Denies any level of pain at this time. Continued monitoring while on the SBU.

## 2021-02-02 NOTE — GROUP NOTE
Group Therapy Note    Date: 2/2/2021    Group Start Time: 9316  Group End Time: 5071  Group Topic: Psychotherapy    [facility]        Group Therapy Note    Attendees: 4/8         Notes:     Status After IPatient attended and participated in group on reminiscing. ntervention:  Improved    Participation Level: Interactive and Monopolizing    Participation Quality: Attentive and Sharing      Speech:  pressured      Thought Process/Content: Logical      Affective Functioning: Congruent      Mood: euthymic      Level of consciousness:  Alert and Attentive      Response to Learning: Able to verbalize current knowledge/experience      Endings: None Reported    Modes of Intervention: Support and Exploration      Discipline Responsible: /Counselor      Signature:  PARAMJIT Ames

## 2021-02-02 NOTE — BH NOTE
Cade Pandey spent time on the milieu. He sits alone and colors. He also reads books and the Bible and writes information from these books on his coloring pages. He is obsessive about this and will ask to have certain pages photo copied. He is polite and cooperative. He continues with rambling speech that is difficult to understand at times. Medication was reviewed with Cade Pandey and he voiced understanding, he was compliant. He denied all psych symptoms.

## 2021-02-03 PROCEDURE — 6370000000 HC RX 637 (ALT 250 FOR IP): Performed by: INTERNAL MEDICINE

## 2021-02-03 PROCEDURE — 6360000002 HC RX W HCPCS: Performed by: NURSE PRACTITIONER

## 2021-02-03 PROCEDURE — 6370000000 HC RX 637 (ALT 250 FOR IP): Performed by: PSYCHIATRY & NEUROLOGY

## 2021-02-03 PROCEDURE — 1240000000 HC EMOTIONAL WELLNESS R&B

## 2021-02-03 PROCEDURE — 6370000000 HC RX 637 (ALT 250 FOR IP): Performed by: NURSE PRACTITIONER

## 2021-02-03 PROCEDURE — 99232 SBSQ HOSP IP/OBS MODERATE 35: CPT | Performed by: NURSE PRACTITIONER

## 2021-02-03 RX ORDER — AMMONIUM LACTATE 12 G/100G
LOTION TOPICAL PRN
Status: DISCONTINUED | OUTPATIENT
Start: 2021-02-03 | End: 2021-02-08 | Stop reason: HOSPADM

## 2021-02-03 RX ORDER — BUDESONIDE AND FORMOTEROL FUMARATE DIHYDRATE 80; 4.5 UG/1; UG/1
2 AEROSOL RESPIRATORY (INHALATION) 2 TIMES DAILY
Status: DISCONTINUED | OUTPATIENT
Start: 2021-02-03 | End: 2021-02-08 | Stop reason: HOSPADM

## 2021-02-03 RX ADMIN — ACETAMINOPHEN 650 MG: 325 TABLET ORAL at 15:05

## 2021-02-03 RX ADMIN — BUDESONIDE AND FORMOTEROL FUMARATE DIHYDRATE 2 PUFF: 80; 4.5 AEROSOL RESPIRATORY (INHALATION) at 13:56

## 2021-02-03 RX ADMIN — HALOPERIDOL 5 MG: 5 TABLET ORAL at 21:20

## 2021-02-03 RX ADMIN — BUDESONIDE AND FORMOTEROL FUMARATE DIHYDRATE 2 PUFF: 80; 4.5 AEROSOL RESPIRATORY (INHALATION) at 21:21

## 2021-02-03 RX ADMIN — TRAZODONE HYDROCHLORIDE 100 MG: 100 TABLET ORAL at 21:19

## 2021-02-03 RX ADMIN — DIVALPROEX SODIUM 750 MG: 500 TABLET, EXTENDED RELEASE ORAL at 21:20

## 2021-02-03 RX ADMIN — HALOPERIDOL DECANOATE 150 MG: 100 INJECTION INTRAMUSCULAR at 09:52

## 2021-02-03 RX ADMIN — ATORVASTATIN CALCIUM 20 MG: 20 TABLET, FILM COATED ORAL at 21:20

## 2021-02-03 RX ADMIN — BENZTROPINE MESYLATE 2 MG: 1 TABLET ORAL at 21:19

## 2021-02-03 RX ADMIN — MUPIROCIN: 20 OINTMENT TOPICAL at 09:51

## 2021-02-03 ASSESSMENT — PAIN SCALES - GENERAL
PAINLEVEL_OUTOF10: 0
PAINLEVEL_OUTOF10: 10

## 2021-02-03 NOTE — PROGRESS NOTES
Pt compliant with medications this shift, utilized PRN medication for generalized body aches and anxiety. Pt with increased anxiety r/t attempting to call mother and call would not go through. Pt became upset stating \"She's just blowing me off! \" Pt began pacing unit and this nurse offered medication per orders to calm. Medication was effective and Pt observed to rest through night in bed, eyes closed, respirations even and unlabored, no s/s of adverse reaction. Pt up this morning witting disorganized thoughts on papers and requesting copies for \"his file. \" Pt is calm and easily redirectable at this time.

## 2021-02-03 NOTE — PROGRESS NOTES
Psychiatric Progress Note    Mario Juarez  [id number]  02/03/21    ID: Patient is a 61 yrs y.o. male     CC: I need to order things from waterbeds and more. ..... Josh Vazquez    HPI: Mario Juarez is a 61year old  single male with PMHx of schizoaffective disorder, paranoid type, COPD, GERD, arthritis,  and HLD. Patient was sent to SNF from [facility] 1/20/21, as he required wound care and oral antibiotics after podiatry debrided wound. Patient returned to hospital after leaving SNF AMA. SNF, per notes will not take him back due to behavioral issues. While at the hospital patient was placed on a psychiatric hold due to HI and delusions. Other notes sent with the patient report he has been \"grandiose, disorganized, pressured speech and responding to internal stimuli. \" Notes state he asked about Haldol dec but \"not having a shot in a long time.:\" Patient was transferred from [address] [place] [address] to [facility]. When he arrived he signed the voluntary form. Pt remains in agreement with treatment plan. Pt continues to deny and side-effects to current medications. He reports EPS is controlled with cogentin. Pt was polite and cordal during the interview process but he is frustrated due to his \"check is on hold. \" and he is worried about going to a shelter. Support was provided. Has been taking medications and has been compliant with treatment. Pt noted he is \"frustrated  today. \"  Pt noted he feels safe and comfortable on the unit. Currently he denies SI/HI. Pt denies any AV hallucinations yet continues to display bizarre delusions at times. He rates his depression as \"0\" on a scale of 0 to 10 with 0 being none and 10 being horrible. He rates his anxiety as \"3\" on the same scale. Speech is becoming less  rapid and pressured, though remains garbled. He is not noted to be talking to himself as much by nursing staff. When observing patient coloring on unit, he is purposeful and utilized as a coping skill. Josh Vazquez  He reports Value Ref Range    Sodium 137 135 - 145 MMOL/L    Potassium 4.7 3.5 - 5.1 MMOL/L    Chloride 100 99 - 110 mMol/L    CO2 33 (H) 21 - 32 MMOL/L    Anion Gap 4 4 - 16    BUN 11 6 - 23 MG/DL    CREATININE 0.8 (L) 0.9 - 1.3 MG/DL    Glucose 98 70 - 99 MG/DL    Calcium 9.7 8.3 - 10.6 MG/DL    GFR Non-African American >60 >60 mL/min/1.73m2    GFR African American >60 >60 mL/min/1.73m2       PSYCHIATRIC ASSESSMENT / MENTAL STATUS EXAM:   Vitals: Blood pressure 107/66, pulse 83, temperature 97.5 °F (36.4 °C), temperature source Temporal, resp. rate 18, height 5' 8\" (1.727 m), weight 153 lb 14.1 oz (69.8 kg), SpO2 100 %. CONSTITUTIONAL:      Appearance: appears stated age. alert and oriented to person, place, time & situation. no acute distress. Dishelved. Good eye contact. No prominent physical abnormalities. Attitude: Manner is cooperative and pleasant. No irritability noted  Motor: No psychomotor agitation, retardation or abnormal movements noted  Speech: Pressured, garbled, normal volume, tone &  Increased amount. Language: intact understanding and production  Mood: \"I'm fine\"  Affect: euthymic, full range, non-labile, congruent with mood and content of speech  Thought Production: Spontaneous. Thought Form: Coherent, linear, logical & goal-directed. Circumstantiality noted. No tangentiality. No flight of ideas or loosening of associations. Thought Content/Perceptions: No JANE, no AVH, positive delusions and paranoia  Insight: limited  Judgment- limited  Memory: Immediate, recent, and remote appear intact, though not formally tested. Attention: maintained throughout interview  Fund of knowledge: Average  Gait/Balance: WNL/WNL         Impression:   Schizoaffective, currently depressed and paranoid     Problem List:   Schizoaffective disorder, bipolar type ([facility] [address]         PLAN:  Psychiatric management:medication initiation and titration, group and individual therapy, safe and theraputic environment.   Status of

## 2021-02-03 NOTE — PLAN OF CARE
[facility] Interdisciplinary Treatment Plan Note     Review Date & Time: 02/03 0830    Admission Type:   Admission Type:  Involuntary    Reason for admission:  Reason for Admission: Acute schizophrenia    Patient Diagnosis: schizoaffective disorder, bipolar type      PATIENT STRENGTHS:  Patient Strengths:Strengths: (TIARA)  Patient Strengths and Limitations:Limitations: Multiple barriers to leisure interests, Difficult relationships / poor social skills  Addictive Behavior:Addictive Behavior  In the past 3 months, have you felt or has someone told you that you have a problem with:  : (admits to many addictive behaviors)  Do you have a history of Chemical Use?: Yes  Do you have a history of Alcohol Use?: Yes  Do you have a history of Street Drug Abuse?: Yes  Histroy of Prescripton Drug Abuse?: Yes  Medical Problems:   Past Medical History:   Diagnosis Date    Arthritis     COPD (chronic obstructive pulmonary disease) ([facility] [address]     Hyperlipidemia        Risk:  Fall RiskTotal: 79  Momo Scale Momo Scale Score: 23  BVC Total: 1      Status EXAM:   Status and Exam  Normal: No  Facial Expression: Elevated  Affect: Congruent  Level of Consciousness: Alert  Mood:Normal: No  Mood: Anxious  Motor Activity:Normal: No  Motor Activity: Increased  Interview Behavior: Impulsive  Preception: [place] to Person, [place] to Time, [place] to Place, [place] to Situation  Attention:Normal: No  Attention: Distractible  Thought Processes: Flt.of Ideas, Tangential  Thought Content:Normal: No  Thought Content: Preoccupations  Hallucinations: None  Delusions: No  Memory:Normal: Yes  Insight and Judgment: No  Insight and Judgment: Poor Judgment, Poor Insight, Unrealistic  Present Suicidal Ideation: No  Present Homicidal Ideation: No    Daily Assessment Last Entry:   Daily Sleep (WDL): Exceptions to WDL  Patient Currently in Pain: Denies  Daily Nutrition (WDL): Exceptions to WDL  Appetite Change: Increased  Barriers to

## 2021-02-03 NOTE — PROGRESS NOTES
Patient up to dayroom writing letters most of the day. Refuses groups. Appetite good. Compliant with medications. Continues to talk to self but seems to be decreasing. Endorses some depression 3/10 and appears anxious over checks. Has made several phone calls today. Remains pleasant and directable. Continue to monitor for safety/changes.

## 2021-02-03 NOTE — PLAN OF CARE
Problem: Anger Management/Homicidal Ideation:  Goal: Able to display appropriate communication and problem solving  Description: Able to display appropriate communication and problem solving  2/3/2021 1313 by Isai Gao RN  Outcome: Ongoing  2/2/2021 2335 by Shirley Cardoso RN  Outcome: Ongoing  Goal: Ability to verbalize frustrations and anger appropriately will improve  Description: Ability to verbalize frustrations and anger appropriately will improve  2/3/2021 1313 by Isai Gao RN  Outcome: Ongoing  2/2/2021 2335 by Shirley Cardoso RN  Outcome: Ongoing  Goal: Absence of angry outbursts  Description: Absence of angry outbursts  2/3/2021 1313 by Isai Gao RN  Outcome: Ongoing  2/2/2021 2335 by Shirley Cardoso RN  Outcome: Ongoing  Goal: Absence of homicidal ideation  Description: Absence of homicidal ideation  2/3/2021 1313 by Isai Gao RN  Outcome: Ongoing  2/2/2021 2335 by Shirley Cardoso RN  Outcome: Ongoing  Goal: Participates in care planning  Description: Participates in care planning  2/3/2021 1313 by Isai Gao RN  Outcome: Ongoing  2/2/2021 2335 by Shirley Cardoso RN  Outcome: Ongoing  Goal: Patient specific goal  Description: Patient specific goal  2/3/2021 1313 by Isai Gao RN  Outcome: Ongoing  2/2/2021 2335 by Shirley Cardoso RN  Outcome: Ongoing     Problem: Pain:  Description: Pain management should include both nonpharmacologic and pharmacologic interventions.   Goal: Pain level will decrease  Description: Pain level will decrease  2/3/2021 1313 by Isai Gao RN  Outcome: Ongoing  2/2/2021 2335 by Shirley Cardoso RN  Outcome: Ongoing  Goal: Control of acute pain  Description: Control of acute pain  2/3/2021 1313 by Isai Gao RN  Outcome: Ongoing  2/2/2021 2335 by Shirley Cardoso RN  Outcome: Ongoing  Goal: Control of chronic pain  Description: Control of chronic pain  2/3/2021 1313 by Isai Gao RN  Outcome: Ongoing  2/2/2021 2335 by Meredith Giordano Meliza Co, RN  Outcome: Ongoing

## 2021-02-03 NOTE — GROUP NOTE
Group Therapy Note    Date: 2/3/2021    Group Start Time: 6733  Group End Time: 1130  Group Topic: Psychoeducation    [address] [facility] [place]        Group Therapy Note    Attendees: 6/8       Notes:  Pts participated in recreational therapy group; Relaxation Scripts. Pts engaged in three different types of relaxation scripts; Deep Breathing, Guided Imagery, and Progressive Muscle Relaxation. After completion of each script, pts discussed their thoughts on the what they had just completed. Pt encouraged to attend, pt refused.        Discipline Responsible: Certified Therapeutic Recreation Specialist       Signature:  Colton Messina, [address] [facility] [place]

## 2021-02-03 NOTE — PLAN OF CARE
Problem: Anger Management/Homicidal Ideation:  Goal: Able to display appropriate communication and problem solving  Description: Able to display appropriate communication and problem solving  2/2/2021 2335 by Rosana Garcia RN  Outcome: Ongoing  2/2/2021 1801 by Dom Nunes RN  Outcome: Ongoing  Goal: Ability to verbalize frustrations and anger appropriately will improve  Description: Ability to verbalize frustrations and anger appropriately will improve  2/2/2021 2335 by Rosana Garcia RN  Outcome: Ongoing  2/2/2021 1801 by Dom Nunes RN  Outcome: Ongoing  Goal: Absence of angry outbursts  Description: Absence of angry outbursts  2/2/2021 2335 by Rosana Garcia RN  Outcome: Ongoing  2/2/2021 1801 by Dom Nunes RN  Outcome: Ongoing  Goal: Absence of homicidal ideation  Description: Absence of homicidal ideation  2/2/2021 2335 by Rosana Garcia RN  Outcome: Ongoing  2/2/2021 1801 by Dom Nunes RN  Outcome: Ongoing  Goal: Participates in care planning  Description: Participates in care planning  2/2/2021 2335 by Rosana Garcia RN  Outcome: Ongoing  2/2/2021 1801 by Dom Nunes RN  Outcome: Ongoing  Goal: Patient specific goal  Description: Patient specific goal  2/2/2021 2335 by Rosana Garcia RN  Outcome: Ongoing  2/2/2021 1801 by Dom Nunes RN  Outcome: Ongoing     Problem: Pain:  Description: Pain management should include both nonpharmacologic and pharmacologic interventions.   Goal: Pain level will decrease  Description: Pain level will decrease  2/2/2021 2335 by Rosana Garcia RN  Outcome: Ongoing  2/2/2021 1801 by Dom Nunes RN  Outcome: Ongoing  Goal: Control of acute pain  Description: Control of acute pain  2/2/2021 2335 by Rosana Garcia RN  Outcome: Ongoing  2/2/2021 1801 by Dom Nunes RN  Outcome: Ongoing  Goal: Control of chronic pain  Description: Control of chronic pain  2/2/2021 2335 by Rosana Garcia RN  Outcome: Ongoing  2/2/2021 1801 by Justo De La Rosa RN  Outcome: Ongoing

## 2021-02-03 NOTE — GROUP NOTE
Group Therapy Note    Date: 2/3/2021    Group Start Time: 1400  Group End Time: 1065  Group Topic: Psychotherapy    [address]    [facility]        Group Therapy Note    Attendees: 5/7         Notes:  Patient invited and encouraged to attend but declined.   Discipline Responsible: /Counselor      Signature:  PARAMJIT Hamilton

## 2021-02-03 NOTE — PROGRESS NOTES
Hospitalist Progress Note       Megan Jha M.D.  2/3/2021 12:12 PM  Admit Date: 1/27/2021    PCP: No primary care provider on file. Assessment and Plan:   1. Hyponatremia: resolved     2. Hyperlipidemia:  atorvastatin 20 mg at bedtime continue same.     3. Arthritis of the hands: Patient has ibuprofen for same. 600 mg 3 times daily as needed.     4.  Tardive dyskinesia: Lip smacking noted. Most likely with antipsychotic use. Continue valbenazine tosilate 80 mg daily  - improved    5 dry skin  - ammonium lactate prn    6. COPD  - Advair bid    Patient Active Problem List:     Schizoaffective disorder, bipolar type ([facility] [address]      Subjective:     No chief complaint on file. F/U:  Interval History: want his inhaler, has dry skin on arms hands and thighs which itch    Objective: Intake/Output Summary (Last 24 hours) at 2/3/2021 1212  Last data filed at 2/2/2021 1756  Gross per 24 hour   Intake 480 ml   Output --   Net 480 ml      Vitals:   Vitals:    02/03/21 0900   BP: 107/66   Pulse: 83   Resp: 18   Temp: 97.5 °F (36.4 °C)   SpO2: 100%     Physical Exam:  Gen:  awake, alert, cooperative, no apparent distress, EYES:  Lids and lashes normal, pupils equal, round and reactive to light, extra ocular muscles intact, sclera clear, conjunctiva normal  ENT:  Normocephalic, oral pharynx with moist mucus membranes, tonsils without erythema or exudates,  NECK:  Supple, symmetrical, trachea midline, no adenopathy,  LUNGS:  Clear to auscultate bilaterally, no rales ronchi or wheezing noted. CARDIOVASCULAR:  regular rate and rhythm, normal S1 and S2, no S3 or S4, and no murmur noted  ABDOMEN: Normal BS, Non tender, non distended, no HSM noted. MUSCULOSKELETAL:  ROM of all extremities grossly wnl  NEUROLOGIC,  Cranial nerves II-XII are grossly intact. Motor is 5 out of 5 bilaterally.   Sensory is intact  SKIN:  no bruising or bleeding, normal skin color, texture, turgor and no redness, warmth, or swelling    No results found.-    DATA:    CBC No results for input(s): WBC, HGB, HCT, PLT in the last 72 hours. BMP   Recent Labs     02/02/21  0700      K 4.7      CO2 33*   BUN 11   CREATININE 0.8*     LFT'S No results for input(s): AST, ALT, ALB, BILIDIR, BILITOT, ALKPHOS in the last 72 hours. COAG No results for input(s): INR in the last 72 hours. CARDIAC ENZYMES  No results for input(s): CKTOTAL, CKMB, CKMBINDEX, TROPONINI in the last 72 hours.   U/A:  No results found for: Gia Serna, Lucita Buckner, Ronna Orosco, Lu Webber MD  [facility]

## 2021-02-03 NOTE — GROUP NOTE
Group Therapy Note    Date: 2/3/2021    Group Start Time: 0830  Group End Time: 0900    Number of Participants: 7/8    Type: Morning Goals Group/ Community Meeting    Group Topic/Objective: Set Goal For The Day and to review Unit Rules and Regulations. Patient's Goal:  Pt states his goal is \"Get my ass to [facility]. \"    Notes:  Pt presented with rapid speech and flight of ideas. Pt at times expressing that he is going to live on the unit and other times expressing belief staff is getting him an apartment in [facility]. Pt states he is feeling \"fine\" and is grateful \"I'm here and not homeless. \"  Pt reports restful sleep of ~6 hours. Depression (0-10): 2-3    Anxiety (0-10): 0    Irritability/Aggitation (0-10): 0    Status After Intervention:  Improved    Participation Level:  Active Listener and Interactive    Participation Quality: Sharing    Speech:  pressured and rapid    Thought Process/Content: Flight of ideas    Affective Functioning: Exaggerated    Mood: elevated    Level of consciousness:  Alert and Attentive    Response to Learning: Able to verbalize current knowledge/experience    Endings: None Reported    Modes of Intervention: Education, Support and Socialization    Discipline Responsible: Certified Therapeutic Recreation Specialist     Electronically signed by Bhavna Waldron MA on 2/3/2021 at 9:31 AM

## 2021-02-04 LAB
DOSE AMOUNT: ABNORMAL
DOSE TIME: ABNORMAL
VALPROIC ACID LEVEL: 45.2 UG/ML (ref 50–100)

## 2021-02-04 PROCEDURE — 6370000000 HC RX 637 (ALT 250 FOR IP): Performed by: PSYCHIATRY & NEUROLOGY

## 2021-02-04 PROCEDURE — 6370000000 HC RX 637 (ALT 250 FOR IP): Performed by: INTERNAL MEDICINE

## 2021-02-04 PROCEDURE — 80164 ASSAY DIPROPYLACETIC ACD TOT: CPT

## 2021-02-04 PROCEDURE — 6370000000 HC RX 637 (ALT 250 FOR IP): Performed by: NURSE PRACTITIONER

## 2021-02-04 PROCEDURE — 36415 COLL VENOUS BLD VENIPUNCTURE: CPT

## 2021-02-04 PROCEDURE — 99231 SBSQ HOSP IP/OBS SF/LOW 25: CPT | Performed by: NURSE PRACTITIONER

## 2021-02-04 PROCEDURE — 1240000000 HC EMOTIONAL WELLNESS R&B

## 2021-02-04 RX ADMIN — TRAZODONE HYDROCHLORIDE 150 MG: 50 TABLET ORAL at 19:46

## 2021-02-04 RX ADMIN — ATORVASTATIN CALCIUM 20 MG: 20 TABLET, FILM COATED ORAL at 19:46

## 2021-02-04 RX ADMIN — HALOPERIDOL 5 MG: 5 TABLET ORAL at 19:47

## 2021-02-04 RX ADMIN — BUDESONIDE AND FORMOTEROL FUMARATE DIHYDRATE 2 PUFF: 80; 4.5 AEROSOL RESPIRATORY (INHALATION) at 08:35

## 2021-02-04 RX ADMIN — DIVALPROEX SODIUM 750 MG: 500 TABLET, EXTENDED RELEASE ORAL at 19:46

## 2021-02-04 RX ADMIN — BENZTROPINE MESYLATE 2 MG: 1 TABLET ORAL at 19:46

## 2021-02-04 RX ADMIN — MUPIROCIN: 20 OINTMENT TOPICAL at 08:37

## 2021-02-04 RX ADMIN — LORAZEPAM 1 MG: 1 TABLET ORAL at 19:46

## 2021-02-04 RX ADMIN — BUDESONIDE AND FORMOTEROL FUMARATE DIHYDRATE 2 PUFF: 80; 4.5 AEROSOL RESPIRATORY (INHALATION) at 19:47

## 2021-02-04 RX ADMIN — LORAZEPAM 1 MG: 1 TABLET ORAL at 13:55

## 2021-02-04 ASSESSMENT — PAIN SCALES - GENERAL
PAINLEVEL_OUTOF10: 0
PAINLEVEL_OUTOF10: 0

## 2021-02-04 NOTE — BH NOTE
Discharge Planning: Pt received call from social security office of [facility]. [address] worker asked patient multiple questions which patient answered factually about his residences and expectation for receiving a check. At one point the worker told patient that he would get his February money on his March check since he was in the hospital. This upset patient who asked whether he could get the money earlier if he got out of the hospital on Monday. The worker stated that patient needed to file an address with the [address] office upon discharge and then would be able to get some February money early. Pt became very upset by this and told worker to \"Send me the paper check to the hospital\" at a very loud volume with curse words and hung up.

## 2021-02-04 NOTE — PROGRESS NOTES
Patient up to dayroom most of the day. Talked to [employer] and became agitated after phone call. PRN ativan provided. Compliant with meds. Does not participate in groups. Wrote and mailed several letters today. Appetite good. Continues to talk to himself. No aggressive behavior noted. Will continue to monitor for safety.

## 2021-02-04 NOTE — GROUP NOTE
Group Therapy Note    Date: 2/4/2021    Group Start Time: 2630  Group End Time: 3079     Number of Participants: 6/9    Type: Spirituality    Group Topic/Objective: Religous discussion with [employer]. Notes:  Pt working with SW at time of group.      Discipline Responsible: Certified Therapeutic Recreation Specialist     Electronically signed by Becky Chowdhury [place], [place] on 2/4/2021 at 11:22 AM

## 2021-02-04 NOTE — BH NOTE
Patient given PRN Ativan 1 mg after having a phone conversation with the Social Security Department that didn't go well for him. Patient willingly took the medication.

## 2021-02-04 NOTE — GROUP NOTE
Group Therapy Note    Date: 2/4/2021    Group Start Time: 0830  Group End Time: 0900    Number of Participants: 8/9    Type: Morning Goals Group/ Community Meeting    Group Topic/Objective: Set Goal For The Day and to review Unit Rules and Regulations. Patient's Goal:  Pt states his goal is \"Set up my social security case. \"    Notes:  Pt presented with rapid, tangential speech and circumstantial thinking. With x1-2 prompts, pt able to answer therapist's questions. Pt states he is feeling \"fine\" and is grateful for \"here and not homeless. \"  Pt reports restful sleep of ~6 hours.      Depression (0-10): 3    Anxiety (0-10): 0    Irritability/Aggitation (0-10): 0    Status After Intervention:  Improved    Participation Level: Interactive    Participation Quality: Required prompts    Speech:  pressured and rapid    Thought Process/Content: Circumstantial    Affective Functioning: Congruent    Mood: Bright    Level of consciousness:  Alert    Response to Learning: Able to verbalize current knowledge/experience    Endings: None Reported    Modes of Intervention: Education, Support and Socialization    Discipline Responsible: Certified Therapeutic Recreation Specialist     Electronically signed by Colton Hernandez MA on 2/4/2021 at 1:34 PM

## 2021-02-04 NOTE — PROGRESS NOTES
he is driven and utilizing it as a coping skill. Radha Butt He reports sleeping 4 hours last night; per staff he is not sleeping well. He is oriented x 3. He states he was linked in the past with [employer] and would like to return to them. He also states his Rx is in [address] [facility], [employer] off [employer].     Notes from [facility] of psychiatry in [facility]) reviewed. Patient was placed on a LARSEN Abilify Maintena 4 mg IM q monthly- his last dose was 1/13/2021. He received Abilify 30 mg qhs for 13 days prior to [facility]. No Known Allergies    Medications Prior to Admission: sulfamethoxazole-trimethoprim (BACTRIM DS;SEPTRA DS) 800-160 MG per tablet, Take 1 tablet by mouth 2 times daily  atorvastatin (LIPITOR) 20 MG tablet, Take 20 mg by mouth daily  famotidine (PEPCID) 40 MG tablet, Take 40 mg by mouth daily At bedtime  ibuprofen (ADVIL;MOTRIN) 600 MG tablet, Take 600 mg by mouth every 8 hours as needed for Pain  albuterol sulfate HFA (VENTOLIN HFA) 108 (90 Base) MCG/ACT inhaler, Inhale 2 puffs into the lungs every 6 hours as needed for Wheezing  ARIPiprazole (ABILIFY) 30 MG tablet, Take 30 mg by mouth daily  meloxicam (MOBIC) 15 MG tablet, Take 15 mg by mouth daily as needed for Pain Indications: Adjunct to Opioid Analgesia for Moderate to Severe Pain  doxepin (SINEQUAN) 10 MG capsule, Take 10 mg by mouth nightly  Valbenazine Tosylate 80 MG CAPS, Take by mouth daily  mupirocin (BACTROBAN) 2 % ointment, Apply topically daily Apply topically 3 times daily.     Past Medical History:   Diagnosis Date    Arthritis     COPD (chronic obstructive pulmonary disease) ([facility] [address]     Hyperlipidemia         Patient Active Problem List   Diagnosis    Schizoaffective disorder, bipolar type ([facility] [address]       Review of Systems    OBJECTIVE  Vital Signs:  Vitals:    02/04/21 0758   BP: 116/71   Pulse: 77   Resp: 17   Temp: 97.4 °F (36.3 °C)   SpO2: 100%       Labs:  Recent Results (from the past 48 hour(s))   Valproic acid level, total reviewed with the patient the diagnosis and treatment recommendations to include the risks, benefits, and side effects of chosen medications. The patient verbalized understanding and agreed with the treatment regimen as outlined above. The patient was encouraged to participate in groups. Medical records, Labs, Diagnotic tests reviewed  q15 min safety checks for safety  Interval History. Review current labs   Medication - benztropine 2 mg PO h.s., Depakote  mg PO daily, Haldol 5 mg PO qhs, increased trazodone 150 mg PO h.s. Haldol dec 150 mg LARSEN 2/3/2021. Supportive Therapy Provided  Pt had an opportunity to ask questions and address concerns  Pt encouraged to continue therapy group or individual.  Pt was in agreement with treatment plan. The risks benefits and side effects of medications were discussed with the patient, including alternatives and no treatment.     Electronically signed by GARCIA Watkins CNP on 2/4/2021 at 10:47 AM

## 2021-02-05 PROCEDURE — 6370000000 HC RX 637 (ALT 250 FOR IP): Performed by: NURSE PRACTITIONER

## 2021-02-05 PROCEDURE — 1240000000 HC EMOTIONAL WELLNESS R&B

## 2021-02-05 PROCEDURE — 99232 SBSQ HOSP IP/OBS MODERATE 35: CPT | Performed by: PSYCHIATRY & NEUROLOGY

## 2021-02-05 PROCEDURE — 6370000000 HC RX 637 (ALT 250 FOR IP): Performed by: PSYCHIATRY & NEUROLOGY

## 2021-02-05 PROCEDURE — 6370000000 HC RX 637 (ALT 250 FOR IP): Performed by: INTERNAL MEDICINE

## 2021-02-05 RX ADMIN — LORAZEPAM 1 MG: 1 TABLET ORAL at 21:02

## 2021-02-05 RX ADMIN — ATORVASTATIN CALCIUM 20 MG: 20 TABLET, FILM COATED ORAL at 21:02

## 2021-02-05 RX ADMIN — LORAZEPAM 1 MG: 1 TABLET ORAL at 13:07

## 2021-02-05 RX ADMIN — TRAZODONE HYDROCHLORIDE 150 MG: 50 TABLET ORAL at 21:01

## 2021-02-05 RX ADMIN — HALOPERIDOL 5 MG: 5 TABLET ORAL at 21:02

## 2021-02-05 RX ADMIN — DIVALPROEX SODIUM 750 MG: 500 TABLET, EXTENDED RELEASE ORAL at 21:01

## 2021-02-05 RX ADMIN — BENZTROPINE MESYLATE 2 MG: 1 TABLET ORAL at 21:02

## 2021-02-05 RX ADMIN — BUDESONIDE AND FORMOTEROL FUMARATE DIHYDRATE 2 PUFF: 80; 4.5 AEROSOL RESPIRATORY (INHALATION) at 21:02

## 2021-02-05 RX ADMIN — BUDESONIDE AND FORMOTEROL FUMARATE DIHYDRATE 2 PUFF: 80; 4.5 AEROSOL RESPIRATORY (INHALATION) at 07:53

## 2021-02-05 RX ADMIN — MUPIROCIN: 20 OINTMENT TOPICAL at 07:52

## 2021-02-05 RX ADMIN — LORAZEPAM 1 MG: 1 TABLET ORAL at 06:43

## 2021-02-05 ASSESSMENT — PAIN SCALES - GENERAL: PAINLEVEL_OUTOF10: 0

## 2021-02-05 NOTE — PROGRESS NOTES
Psychiatric Progress Note    Walter Paul  [id number]  02/05/21    ID: Patient is a 61 yrs y.o. male     CC: I need to order things from waterbeds and more. ..... Evelyn Jerry    HPI: Walter Paul is a 61year old  single male with PMHx of schizoaffective disorder, paranoid type, COPD, GERD, arthritis,  and HLD. Patient was sent to SNF from [facility] 1/20/21, as he required wound care and oral antibiotics after podiatry debrided wound. Patient returned to hospital after leaving SNF AMA. SNF, per notes will not take him back due to behavioral issues. While at the hospital patient was placed on a psychiatric hold due to HI and delusions. Other notes sent with the patient report he has been \"grandiose, disorganized, pressured speech and responding to internal stimuli. \" Notes state he asked about Haldol dec but \"not having a shot in a long time.:\" Patient was transferred from [address] [place] [address] to [facility]. When he arrived he signed the voluntary form. Pt remains in agreement with treatment plan. Pt denied any side-effects to current medications. Pt was polite and cordal during the interview process. Pt remains compliant with current medications and treatment. Pt noted he is doing  \"much better today. \"  Pt noted he feels safe and comfortable on the unit. Pt was polite and cordial during the interview process. Currently he denies SI/HI. Pt denies any AV hallucinations yet continues to display bizarre delusions at times. He rates his depression as \"3\" on a scale of 0 to 10 with 0 being none and 10 being horrible. He rates his anxiety as \"0\" on the same scale. At times speech continues to be rapid and pressured yet significantly reduced. He states he was not able to sleep last night; per staff he slept 6 hours of broken sleep. He is oriented x 4    He states he was linked in the past with [facility] and would like to return to them.  He also states his Rx is in [address] [place] [address], [facility] [address] [facility].    No Known Allergies    Medications Prior to Admission: sulfamethoxazole-trimethoprim (BACTRIM DS;SEPTRA DS) 800-160 MG per tablet, Take 1 tablet by mouth 2 times daily  atorvastatin (LIPITOR) 20 MG tablet, Take 20 mg by mouth daily  famotidine (PEPCID) 40 MG tablet, Take 40 mg by mouth daily At bedtime  ibuprofen (ADVIL;MOTRIN) 600 MG tablet, Take 600 mg by mouth every 8 hours as needed for Pain  albuterol sulfate HFA (VENTOLIN HFA) 108 (90 Base) MCG/ACT inhaler, Inhale 2 puffs into the lungs every 6 hours as needed for Wheezing  ARIPiprazole (ABILIFY) 30 MG tablet, Take 30 mg by mouth daily  meloxicam (MOBIC) 15 MG tablet, Take 15 mg by mouth daily as needed for Pain Indications: Adjunct to Opioid Analgesia for Moderate to Severe Pain  doxepin (SINEQUAN) 10 MG capsule, Take 10 mg by mouth nightly  Valbenazine Tosylate 80 MG CAPS, Take by mouth daily  mupirocin (BACTROBAN) 2 % ointment, Apply topically daily Apply topically 3 times daily. Past Medical History:   Diagnosis Date    Arthritis     COPD (chronic obstructive pulmonary disease) ([facility]     Hyperlipidemia         Patient Active Problem List   Diagnosis    Schizoaffective disorder, bipolar type ([facility]       Review of Systems    OBJECTIVE  Vital Signs:  Vitals:    02/04/21 1952   BP: 132/72   Pulse: 95   Resp: 16   Temp: 98.8 °F (37.1 °C)   SpO2: 97%       Labs:  Recent Results (from the past 48 hour(s))   Valproic acid level, total    Collection Time: 02/04/21  7:00 AM   Result Value Ref Range    Valproic Acid Lvl 45.2 (L) 50 - 100 UG/ML    DOSE AMOUNT DOSE AMT. GIVEN - 750 mg ER     DOSE TIME DOSE TIME GIVEN - 0900        PSYCHIATRIC ASSESSMENT / MENTAL STATUS EXAM:   Vitals: Blood pressure 132/72, pulse 95, temperature 98.8 °F (37.1 °C), temperature source Temporal, resp. rate 16, height 5' 8\" (1.727 m), weight 153 lb 14.1 oz (69.8 kg), SpO2 97 %. CONSTITUTIONAL:      Appearance: appears stated age. alert and oriented to person, place, time & situation. no acute distress. Adequate grooming and hygeine. Good eye contact. No prominent physical abnormalities. Attitude: Manner is cooperative and pleasant but with boughts of irritability  Motor: No psychomotor agitation, retardation or abnormal movements noted  Speech: Pressured and difficult to interrupt, Normal volume, tone &  Increased amount. Language: intact understanding and production  Mood: depressed  Affect: euthymic, full range, non-labile, congruent with mood and content of speech  Thought Production: Spontaneous. Thought Form: Coherent, linear, logical & goal-directed. No tangentiality or circumstantiality. No flight of ideas or loosening of associations. Thought Content/Perceptions: No JANE, no AVH, positive delusions and paranoia  Insight: limited  Judgment- limited  Memory: Immediate, recent, and remote appear intact, though not formally tested. Attention: maintained throughout interview  Fund of knowledge: Average  Gait/Balance: WNL/WNL         Impression:   Schizoaffective, currently depressed and paranoid     Problem List:   Schizoaffective disorder, bipolar type ([facility]         PLAN:  Psychiatric management:medication initiation and titration, group and individual therapy, safe and theraputic environment. Status of problem/condition: ?Improving  Medical co-morbidities: [facility], appreciate assistance  Legal Status:Pending  Disposition:estimated LOS: Pending. The treatment team reviewed with the patient the diagnosis and treatment recommendations to include the risks, benefits, and side effects of chosen medications. The patient verbalized understanding and agreed with the treatment regimen as outlined above. The patient was encouraged to participate in groups. Medical records, Labs, Diagnotic tests reviewed  q15 min safety checks for safety  Interval History.   Review current labs   Supportive Therapy Provided  Pt had an opportunity to ask questions and address

## 2021-02-05 NOTE — GROUP NOTE
Group Therapy Note    Date: 2/5/2021    Group Start Time: 1030  Group End Time: 5739  Group Topic: Psychoeducation    [address] [place], [place]        Group Therapy Note    Attendees: 4/8       Notes:  Pts participated in recreational therapy group; Theme Song Quiz. Pts were given various tv show theme songs from the 46s and 62s. As they listened; they had to guess the show the song came from. Purpose was to encourage reminiscence discussion. Pt encouraged to attend, pt refused.        Discipline Responsible: Certified Therapeutic Recreation Specialist       Signature:  Ernesto Favre, [address] [place] [address]

## 2021-02-05 NOTE — GROUP NOTE
Group Therapy Note    Date: 2/5/2021    Group Start Time: 0830  Group End Time: 0900    Number of Participants: 7/8    Type: Morning Goals Group/ Community Meeting    Group Topic/Objective: Set Goal For The Day and to review Unit Rules and Regulations. Patient's Goal:  Pt states his goal is \"Trying to get social security ass in gear. \"    Notes:  Pt fixated on calling social security at this time. Pt not receptive to education about discussion he had with social security yesterday and does not believe they gave him truthful information. Pt wanting to speak with SW about this. SW informed. Pt states he is feeling \"not to bad\" and is grateful \"not to be in this weather. \"  Pt reports restful sleep of ~8 hours.      Depression (0-10): 0    Anxiety (0-10): 0    Irritability/Aggitation (0-10): 0    Status After Intervention:  Unchanged    Participation Level: Interactive    Participation Quality: Resistant    Speech:  pressured and rapid    Thought Process/Content: Perseverating    Affective Functioning: Incongruent    Mood: irritable    Level of consciousness:  Preoccupied    Response to Learning: Resistant    Endings: None Reported    Modes of Intervention: Education, Support and Socialization    Discipline Responsible: Certified Therapeutic Recreation Specialist     Electronically signed by Iglesia Vazquez MA on 2/5/2021 at 9:43 AM

## 2021-02-05 NOTE — GROUP NOTE
Group Therapy Note    Date: 2/5/2021    Group Start Time: 3295  Group End Time: 1500  Group Topic: Psychotherapy    [address] [facility], [place]AM[place]        Group Therapy Note    Attendees: 5/7        Notes:   Patient attended and participated in reminiscence exercise.     Status After Intervention:  Improved    Participation Level: Monopolizing    Participation Quality: Appropriate and Sharing      Speech:  normal      Thought Process/Content: Logical      Affective Functioning: Congruent      Mood: euthymic      Level of consciousness:  Alert and Attentive      Response to Learning: Able to verbalize current knowledge/experience      Endings: None Reported    Modes of Intervention: Support, Socialization and Exploration      Discipline Responsible: /Counselor      Signature:  PARAMJIT Marcelo

## 2021-02-05 NOTE — CARE COORDINATION
Patient continues to get agitated about his money. Patient has been told the protocol per Social Security office in [place], with this LSW and other staff reinforcing the information on how patient can get his SSDI check coming to him again. Patient gets angry and agitated during conversation about it. Explained to patient that an APS referral has been made to help assist him with further services in the community once he has discharged.

## 2021-02-05 NOTE — GROUP NOTE
Group Therapy Note    Date: 2/5/2021    Group Start Time: 2129  Group End Time: 1600    Number of Participants: 5/7    Type: Exercise/Recreation Group    Group Topic/Objective: Chair Exercises    Notes:  Pt encouraged to attend, pt refused.      Discipline Responsible: Certified Therapeutic Recreation Specialist     Electronically signed by Naya [place] on 2/5/2021 at 4:17 PM

## 2021-02-05 NOTE — PROGRESS NOTES
Patient with increased agitation today. Continues to talk about his toe being amputated. Unable to redirect. Also is preoccupied with his checks and becomes increasingly agitated. Medicated with PRN anxiety medication with + results. Rates depression 3/10. Wrote several letters today. Appetite good. Will continue to monitor for safety. Patient laid down and took a nap. Awoke and verbalizes he feels much better. Appears more relaxed.

## 2021-02-06 PROCEDURE — 6370000000 HC RX 637 (ALT 250 FOR IP): Performed by: INTERNAL MEDICINE

## 2021-02-06 PROCEDURE — 6370000000 HC RX 637 (ALT 250 FOR IP): Performed by: NURSE PRACTITIONER

## 2021-02-06 PROCEDURE — 6370000000 HC RX 637 (ALT 250 FOR IP): Performed by: PSYCHIATRY & NEUROLOGY

## 2021-02-06 PROCEDURE — 1240000000 HC EMOTIONAL WELLNESS R&B

## 2021-02-06 PROCEDURE — 99232 SBSQ HOSP IP/OBS MODERATE 35: CPT | Performed by: PSYCHIATRY & NEUROLOGY

## 2021-02-06 RX ORDER — CALCIUM CARBONATE 200(500)MG
500 TABLET,CHEWABLE ORAL EVERY 6 HOURS PRN
Status: DISCONTINUED | OUTPATIENT
Start: 2021-02-06 | End: 2021-02-08 | Stop reason: HOSPADM

## 2021-02-06 RX ADMIN — ATORVASTATIN CALCIUM 20 MG: 20 TABLET, FILM COATED ORAL at 21:10

## 2021-02-06 RX ADMIN — CALCIUM CARBONATE 500 MG: 500 TABLET, CHEWABLE ORAL at 18:54

## 2021-02-06 RX ADMIN — ACETAMINOPHEN 650 MG: 325 TABLET ORAL at 05:43

## 2021-02-06 RX ADMIN — MUPIROCIN: 20 OINTMENT TOPICAL at 09:02

## 2021-02-06 RX ADMIN — LORAZEPAM 1 MG: 1 TABLET ORAL at 21:10

## 2021-02-06 RX ADMIN — BUDESONIDE AND FORMOTEROL FUMARATE DIHYDRATE 2 PUFF: 80; 4.5 AEROSOL RESPIRATORY (INHALATION) at 09:03

## 2021-02-06 RX ADMIN — TRAZODONE HYDROCHLORIDE 150 MG: 50 TABLET ORAL at 21:11

## 2021-02-06 RX ADMIN — DIVALPROEX SODIUM 750 MG: 500 TABLET, EXTENDED RELEASE ORAL at 21:10

## 2021-02-06 RX ADMIN — CALCIUM CARBONATE 500 MG: 500 TABLET, CHEWABLE ORAL at 15:08

## 2021-02-06 RX ADMIN — LORAZEPAM 1 MG: 1 TABLET ORAL at 15:22

## 2021-02-06 RX ADMIN — HALOPERIDOL 5 MG: 5 TABLET ORAL at 21:11

## 2021-02-06 RX ADMIN — BENZTROPINE MESYLATE 2 MG: 1 TABLET ORAL at 21:10

## 2021-02-06 RX ADMIN — BUDESONIDE AND FORMOTEROL FUMARATE DIHYDRATE 2 PUFF: 80; 4.5 AEROSOL RESPIRATORY (INHALATION) at 21:09

## 2021-02-06 RX ADMIN — LORAZEPAM 1 MG: 1 TABLET ORAL at 05:21

## 2021-02-06 ASSESSMENT — PAIN SCALES - GENERAL
PAINLEVEL_OUTOF10: 0
PAINLEVEL_OUTOF10: 10

## 2021-02-06 NOTE — PROGRESS NOTES
Assessment completed. Pt is alert and oriented to person, place, time and situation. Pt reports sleeping well other than a disturbance on the unit last night. Pt rates depression a 3 out of 10 with ten being the worst and anxiety as \"alright\" after his ativan. Pt denies auditory or visual hallucinations. Pt denies delusions. Pt denies SI. He does state he will tell the staff he will hang himself if they try to make him leave today. Pt denies HI.

## 2021-02-06 NOTE — PROGRESS NOTES
Psychiatric Progress Note    Walter Paul  [id number]  02/06/21    ID: Patient is a 61 yrs y.o. male     CC: I need to order things from waterbeds and more. ..... Evelyn Jerry    HPI: Walter Paul is a 61year old  single male with PMHx of schizoaffective disorder, paranoid type, COPD, GERD, arthritis,  and HLD. Patient was sent to SNF from [facility] 1/20/21, as he required wound care and oral antibiotics after podiatry debrided wound. Patient returned to hospital after leaving SNF AMA. SNF, per notes will not take him back due to behavioral issues. While at the hospital patient was placed on a psychiatric hold due to HI and delusions. Other notes sent with the patient report he has been \"grandiose, disorganized, pressured speech and responding to internal stimuli. \" Notes state he asked about Haldol dec but \"not having a shot in a long time.:\" Patient was transferred from [address] [place] [address] to [facility]. When he arrived he signed the voluntary form. Pt remains in agreement with treatment plan. Pt denied any side-effects to current medications. Pt was polite and cordal during the interview process. Pt remains compliant with current medications and treatment. Pt noted he is doing  \"much better today. \"  Pt noted he feels safe and comfortable on the unit. Pt was polite and cordial during the interview process. Currently he denies SI/HI. Pt denies any AV hallucinations yet continues to display bizarre delusions at times. He rates his depression as \"2\" on a scale of 0 to 10 with 0 being none and 10 being horrible. He rates his anxiety as \"0\" on the same scale. At times speech continues to be rapid and pressured yet significantly reduced. He states he was not able to sleep last night; per staff he slept 6 hours of broken sleep. He is oriented x 4    He states he was linked in the past with [facility] and would like to return to them.  He also states his Rx is in [address] [place] [address], [place] [address] [address] [facility].    No Known Allergies    Medications Prior to Admission: sulfamethoxazole-trimethoprim (BACTRIM DS;SEPTRA DS) 800-160 MG per tablet, Take 1 tablet by mouth 2 times daily  atorvastatin (LIPITOR) 20 MG tablet, Take 20 mg by mouth daily  famotidine (PEPCID) 40 MG tablet, Take 40 mg by mouth daily At bedtime  ibuprofen (ADVIL;MOTRIN) 600 MG tablet, Take 600 mg by mouth every 8 hours as needed for Pain  albuterol sulfate HFA (VENTOLIN HFA) 108 (90 Base) MCG/ACT inhaler, Inhale 2 puffs into the lungs every 6 hours as needed for Wheezing  ARIPiprazole (ABILIFY) 30 MG tablet, Take 30 mg by mouth daily  meloxicam (MOBIC) 15 MG tablet, Take 15 mg by mouth daily as needed for Pain Indications: Adjunct to Opioid Analgesia for Moderate to Severe Pain  doxepin (SINEQUAN) 10 MG capsule, Take 10 mg by mouth nightly  Valbenazine Tosylate 80 MG CAPS, Take by mouth daily  mupirocin (BACTROBAN) 2 % ointment, Apply topically daily Apply topically 3 times daily. Past Medical History:   Diagnosis Date    Arthritis     COPD (chronic obstructive pulmonary disease) ([facility]     Hyperlipidemia         Patient Active Problem List   Diagnosis    Schizoaffective disorder, bipolar type ([facility]       Review of Systems    OBJECTIVE  Vital Signs:  Vitals:    02/06/21 0800   BP: 113/77   Pulse: 78   Resp: 16   Temp: 97.2 °F (36.2 °C)   SpO2: 100%       Labs:  No results found for this or any previous visit (from the past 48 hour(s)). PSYCHIATRIC ASSESSMENT / MENTAL STATUS EXAM:   Vitals: Blood pressure 113/77, pulse 78, temperature 97.2 °F (36.2 °C), temperature source Temporal, resp. rate 16, height 5' 8\" (1.727 m), weight 153 lb 14.1 oz (69.8 kg), SpO2 100 %. CONSTITUTIONAL:      Appearance: appears stated age. alert and oriented to person, place, time & situation. no acute distress. Adequate grooming and hygeine. Good eye contact. No prominent physical abnormalities. Attitude:  Manner is cooperative and pleasant but with boughts of irritability  Motor: No psychomotor agitation, retardation or abnormal movements noted  Speech: Pressured and difficult to interrupt, Normal volume, tone &  Increased amount. Language: intact understanding and production  Mood: depressed  Affect: euthymic, full range, non-labile, congruent with mood and content of speech  Thought Production: Spontaneous. Thought Form: Coherent, linear, logical & goal-directed. No tangentiality or circumstantiality. No flight of ideas or loosening of associations. Thought Content/Perceptions: No JANE, no AVH, positive delusions and paranoia  Insight: limited  Judgment- limited  Memory: Immediate, recent, and remote appear intact, though not formally tested. Attention: maintained throughout interview  Fund of knowledge: Average  Gait/Balance: WNL/WNL         Impression:   Schizoaffective, currently depressed and paranoid     Problem List:   Schizoaffective disorder, bipolar type ([facility]         PLAN:  Psychiatric management:medication initiation and titration, group and individual therapy, safe and theraputic environment. Status of problem/condition: ?Improving  Medical co-morbidities: [facility], appreciate assistance  Legal Status:Pending  Disposition:estimated LOS: Pending. The treatment team reviewed with the patient the diagnosis and treatment recommendations to include the risks, benefits, and side effects of chosen medications. The patient verbalized understanding and agreed with the treatment regimen as outlined above. The patient was encouraged to participate in groups. Medical records, Labs, Diagnotic tests reviewed  q15 min safety checks for safety  Interval History. Review current labs   Supportive Therapy Provided  Pt had an opportunity to ask questions and address concerns  Pt encouraged to continue therapy group or individual.  Pt was in agreement with treatment plan.   The risks benefits and side effects of medications were discussed with the patient, including alternatives and no treatment.     Electronically signed by Camden Figueroa DO on 2/6/2021 at 3:00 PM

## 2021-02-06 NOTE — PLAN OF CARE
Problem: Anger Management/Homicidal Ideation:  Goal: Able to display appropriate communication and problem solving  Description: Able to display appropriate communication and problem solving  2/6/2021 1049 by Magdaleno Waggoner RN  Outcome: Ongoing  2/6/2021 0007 by Ignacio Bowman RN  Outcome: Ongoing  Goal: Ability to verbalize frustrations and anger appropriately will improve  Description: Ability to verbalize frustrations and anger appropriately will improve  2/6/2021 1049 by Magdaleno Waggoner RN  Outcome: Ongoing  2/6/2021 0007 by Ignacio Bowman RN  Outcome: Ongoing  Goal: Absence of angry outbursts  Description: Absence of angry outbursts  2/6/2021 1049 by Magdaleno Waggoner RN  Outcome: Ongoing  2/6/2021 0007 by Ignacio Bowman RN  Outcome: Ongoing  Goal: Absence of homicidal ideation  Description: Absence of homicidal ideation  2/6/2021 1049 by Magdaleno Waggoner RN  Outcome: Ongoing  2/6/2021 0007 by Ignacio Bowman RN  Outcome: Ongoing  Goal: Participates in care planning  Description: Participates in care planning  2/6/2021 1049 by Magdaleno Waggoner RN  Outcome: Ongoing  2/6/2021 0007 by Ignacio Bowman RN  Outcome: Ongoing  Goal: Patient specific goal  Description: Patient specific goal  2/6/2021 1049 by Magdaleno Waggoner RN  Outcome: Ongoing  2/6/2021 0007 by Ignacio Bowman RN  Outcome: Ongoing     Problem: Pain:  Goal: Pain level will decrease  Description: Pain level will decrease  2/6/2021 1049 by Magdaleno Waggoner RN  Outcome: Ongoing  2/6/2021 0007 by Ignacio Bowman RN  Outcome: Ongoing  Goal: Control of acute pain  Description: Control of acute pain  2/6/2021 1049 by Magdaleno Waggoner RN  Outcome: Ongoing  2/6/2021 0007 by Ignacio Bowman RN  Outcome: Ongoing  Goal: Control of chronic pain  Description: Control of chronic pain  2/6/2021 1049 by Magdaleno Waggoner RN  Outcome: Ongoing  2/6/2021 0007 by Ignacio Bowman RN  Outcome: Ongoing

## 2021-02-07 PROCEDURE — 6370000000 HC RX 637 (ALT 250 FOR IP): Performed by: NURSE PRACTITIONER

## 2021-02-07 PROCEDURE — 1240000000 HC EMOTIONAL WELLNESS R&B

## 2021-02-07 PROCEDURE — 99232 SBSQ HOSP IP/OBS MODERATE 35: CPT | Performed by: PSYCHIATRY & NEUROLOGY

## 2021-02-07 PROCEDURE — 6370000000 HC RX 637 (ALT 250 FOR IP): Performed by: INTERNAL MEDICINE

## 2021-02-07 PROCEDURE — 6370000000 HC RX 637 (ALT 250 FOR IP): Performed by: PSYCHIATRY & NEUROLOGY

## 2021-02-07 RX ADMIN — ACETAMINOPHEN 325 MG: 325 TABLET ORAL at 10:12

## 2021-02-07 RX ADMIN — TRAZODONE HYDROCHLORIDE 150 MG: 50 TABLET ORAL at 20:17

## 2021-02-07 RX ADMIN — DIVALPROEX SODIUM 750 MG: 500 TABLET, EXTENDED RELEASE ORAL at 20:17

## 2021-02-07 RX ADMIN — BUDESONIDE AND FORMOTEROL FUMARATE DIHYDRATE 2 PUFF: 80; 4.5 AEROSOL RESPIRATORY (INHALATION) at 08:15

## 2021-02-07 RX ADMIN — HALOPERIDOL 5 MG: 5 TABLET ORAL at 20:17

## 2021-02-07 RX ADMIN — LORAZEPAM 1 MG: 1 TABLET ORAL at 17:22

## 2021-02-07 RX ADMIN — CALCIUM CARBONATE 500 MG: 500 TABLET, CHEWABLE ORAL at 08:15

## 2021-02-07 RX ADMIN — LORAZEPAM 1 MG: 1 TABLET ORAL at 21:52

## 2021-02-07 RX ADMIN — BENZTROPINE MESYLATE 2 MG: 1 TABLET ORAL at 20:18

## 2021-02-07 RX ADMIN — BUDESONIDE AND FORMOTEROL FUMARATE DIHYDRATE 2 PUFF: 80; 4.5 AEROSOL RESPIRATORY (INHALATION) at 19:53

## 2021-02-07 RX ADMIN — CALCIUM CARBONATE 500 MG: 500 TABLET, CHEWABLE ORAL at 15:46

## 2021-02-07 RX ADMIN — ATORVASTATIN CALCIUM 20 MG: 20 TABLET, FILM COATED ORAL at 20:17

## 2021-02-07 RX ADMIN — LORAZEPAM 1 MG: 1 TABLET ORAL at 08:15

## 2021-02-07 RX ADMIN — LORAZEPAM 1 MG: 1 TABLET ORAL at 12:38

## 2021-02-07 RX ADMIN — ACETAMINOPHEN 500 MG: 500 TABLET ORAL at 21:52

## 2021-02-07 RX ADMIN — MUPIROCIN: 20 OINTMENT TOPICAL at 08:15

## 2021-02-07 ASSESSMENT — PAIN SCALES - GENERAL
PAINLEVEL_OUTOF10: 3
PAINLEVEL_OUTOF10: 6
PAINLEVEL_OUTOF10: 0

## 2021-02-07 ASSESSMENT — PAIN DESCRIPTION - LOCATION: LOCATION: HAND

## 2021-02-07 ASSESSMENT — PAIN DESCRIPTION - ORIENTATION: ORIENTATION: LEFT

## 2021-02-07 NOTE — PLAN OF CARE
Problem: Anger Management/Homicidal Ideation:  Goal: Able to display appropriate communication and problem solving  Description: Able to display appropriate communication and problem solving  2/6/2021 2338 by Harrison Arboleda RN  Outcome: Ongoing  2/6/2021 1049 by Geovanna Acevedo RN  Outcome: Ongoing  Goal: Ability to verbalize frustrations and anger appropriately will improve  Description: Ability to verbalize frustrations and anger appropriately will improve  2/6/2021 2338 by Harrison Arboleda RN  Outcome: Ongoing  2/6/2021 1049 by Geovanna Acevedo RN  Outcome: Ongoing  Goal: Absence of angry outbursts  Description: Absence of angry outbursts  2/6/2021 2338 by Harrison Arboleda RN  Outcome: Ongoing  2/6/2021 1049 by Geovanna Acevedo RN  Outcome: Ongoing  Goal: Absence of homicidal ideation  Description: Absence of homicidal ideation  2/6/2021 2338 by Harrison Arboleda RN  Outcome: Ongoing  2/6/2021 1049 by Geovanna Acevedo RN  Outcome: Ongoing  Goal: Participates in care planning  Description: Participates in care planning  2/6/2021 2338 by Harrison Arboleda RN  Outcome: Ongoing  2/6/2021 1049 by Geovanna Acevedo RN  Outcome: Ongoing  Goal: Patient specific goal  Description: Patient specific goal  2/6/2021 2338 by Harrison Arboleda RN  Outcome: Ongoing  2/6/2021 1049 by Geovanna Acevedo RN  Outcome: Ongoing     Problem: Pain:  Goal: Pain level will decrease  Description: Pain level will decrease  2/6/2021 2338 by Harrison Arboleda RN  Outcome: Ongoing  2/6/2021 1049 by Geovanna Acevedo RN  Outcome: Ongoing  Goal: Control of acute pain  Description: Control of acute pain  2/6/2021 2338 by Harrison Arboleda RN  Outcome: Ongoing  2/6/2021 1049 by Geovanna Acevedo RN  Outcome: Ongoing  Goal: Control of chronic pain  Description: Control of chronic pain  2/6/2021 2338 by Harrison Arboleda RN  Outcome: Ongoing  2/6/2021 1049 by Geovanna Acevedo RN  Outcome: Ongoing

## 2021-02-07 NOTE — PROGRESS NOTES
Psychiatric Progress Note    Carin Jose  [id number]  02/07/21    ID: Patient is a 61 yrs y.o. male     CC: I need to order things from waterbeds and more. ..... Dalia Profit    HPI: Carin Jose is a 61year old  single male with PMHx of schizoaffective disorder, paranoid type, COPD, GERD, arthritis,  and HLD. Patient was sent to SNF from [facility] 1/20/21, as he required wound care and oral antibiotics after podiatry debrided wound. Patient returned to hospital after leaving SNF AMA. SNF, per notes will not take him back due to behavioral issues. While at the hospital patient was placed on a psychiatric hold due to HI and delusions. Other notes sent with the patient report he has been \"grandiose, disorganized, pressured speech and responding to internal stimuli. \" Notes state he asked about Haldol dec but \"not having a shot in a long time.:\" Patient was transferred from [address] [place] [address] to [facility]. When he arrived he signed the voluntary form. Pt remains in agreement with treatment plan. Pt denied any side-effects to current medications. Pt was polite and cordal during the interview process. Pt remains compliant with current medications and treatment. Pt noted he is doing  \"really good today. \"  Pt noted he feels safe and comfortable on the unit. Pt was polite and cordial during the interview process. Currently he denies SI/HI. Pt denies any AV hallucinations yet continues to display bizarre delusions at times. He rates his depression as \"2\" on a scale of 0 to 10 with 0 being none and 10 being horrible. He rates his anxiety as \"0\" on the same scale. At times speech continues to be rapid and pressured yet significantly reduced. He states he was not able to sleep last night; per staff he slept 6 hours of broken sleep. He is oriented x 4    He states he was linked in the past with [facility] and would like to return to them.  He also states his Rx is in [address] [place] [address] off [address].    No Known Allergies    Medications Prior to Admission: sulfamethoxazole-trimethoprim (BACTRIM DS;SEPTRA DS) 800-160 MG per tablet, Take 1 tablet by mouth 2 times daily  atorvastatin (LIPITOR) 20 MG tablet, Take 20 mg by mouth daily  famotidine (PEPCID) 40 MG tablet, Take 40 mg by mouth daily At bedtime  ibuprofen (ADVIL;MOTRIN) 600 MG tablet, Take 600 mg by mouth every 8 hours as needed for Pain  albuterol sulfate HFA (VENTOLIN HFA) 108 (90 Base) MCG/ACT inhaler, Inhale 2 puffs into the lungs every 6 hours as needed for Wheezing  ARIPiprazole (ABILIFY) 30 MG tablet, Take 30 mg by mouth daily  meloxicam (MOBIC) 15 MG tablet, Take 15 mg by mouth daily as needed for Pain Indications: Adjunct to Opioid Analgesia for Moderate to Severe Pain  doxepin (SINEQUAN) 10 MG capsule, Take 10 mg by mouth nightly  Valbenazine Tosylate 80 MG CAPS, Take by mouth daily  mupirocin (BACTROBAN) 2 % ointment, Apply topically daily Apply topically 3 times daily. Past Medical History:   Diagnosis Date    Arthritis     COPD (chronic obstructive pulmonary disease) ([facility]     Hyperlipidemia         Patient Active Problem List   Diagnosis    Schizoaffective disorder, bipolar type ([facility]       Review of Systems    OBJECTIVE  Vital Signs:  Vitals:    02/07/21 0800   BP: 117/67   Pulse: 84   Resp: 18   Temp: 97.6 °F (36.4 °C)   SpO2: 99%       Labs:  No results found for this or any previous visit (from the past 48 hour(s)). PSYCHIATRIC ASSESSMENT / MENTAL STATUS EXAM:   Vitals: Blood pressure 117/67, pulse 84, temperature 97.6 °F (36.4 °C), temperature source Temporal, resp. rate 18, height 5' 8\" (1.727 m), weight 153 lb 14.1 oz (69.8 kg), SpO2 99 %. CONSTITUTIONAL:      Appearance: appears stated age. alert and oriented to person, place, time & situation. no acute distress. Adequate grooming and hygeine. Good eye contact. No prominent physical abnormalities. Attitude:  Manner is cooperative and pleasant but with boughts of with the patient, including alternatives and no treatment.     Electronically signed by Susie David DO on 2/7/2021 at 1:30 PM

## 2021-02-07 NOTE — BH NOTE
Pt out on the unit during assessment and med pass. Anxious but cooperative. Pt requested Ativan due to his increased anxiety about leaving. Pt is alert and oriented to person, place, time and situation. Pt denies AVH or delusions   Pt denies SI although he does state he will \"go out there and blow his brains out if we dump him out in the cold. \"     Pt has been sleeping well throughout this night with only waking up one time and coming to the day room up to this time. Will continue to monitor pts safety closely while on the SBU.

## 2021-02-07 NOTE — PROGRESS NOTES
Assessment completed. Pt is alert and oriented to person, place, time and situation. Pt reports sleeping well. Pt rates depression a 3 out of 10 with ten being the worst and anxiety a 8 out of ten on the same scale. Pt denies auditory or visual hallucinations. Pt denies delusions. Pt denies SI. He says he will \"blow his brains out if they kick him out in the cold\". Pt denies HI.

## 2021-02-07 NOTE — PLAN OF CARE
Problem: Anger Management/Homicidal Ideation:  Goal: Able to display appropriate communication and problem solving  Description: Able to display appropriate communication and problem solving  2/7/2021 1042 by Reema Bridges RN  Outcome: Ongoing  2/6/2021 2338 by Sirena Lo RN  Outcome: Ongoing  Goal: Ability to verbalize frustrations and anger appropriately will improve  Description: Ability to verbalize frustrations and anger appropriately will improve  2/7/2021 1042 by Reema Bridges RN  Outcome: Ongoing  2/6/2021 2338 by Sirena Lo RN  Outcome: Ongoing  Goal: Absence of angry outbursts  Description: Absence of angry outbursts  2/7/2021 1042 by Reema Bridges RN  Outcome: Ongoing  2/6/2021 2338 by Sirena Lo RN  Outcome: Ongoing  Goal: Absence of homicidal ideation  Description: Absence of homicidal ideation  2/7/2021 1042 by Reema Bridges RN  Outcome: Ongoing  2/6/2021 2338 by Sirena Lo RN  Outcome: Ongoing  Goal: Participates in care planning  Description: Participates in care planning  2/7/2021 1042 by Reema Bridges RN  Outcome: Ongoing  2/6/2021 2338 by Sirena Lo RN  Outcome: Ongoing  Goal: Patient specific goal  Description: Patient specific goal  2/7/2021 1042 by Reema Bridges RN  Outcome: Ongoing  2/6/2021 2338 by Sirena Lo RN  Outcome: Ongoing     Problem: Pain:  Goal: Pain level will decrease  Description: Pain level will decrease  2/7/2021 1042 by Reema Bridges RN  Outcome: Ongoing  2/6/2021 2338 by Sirena Lo RN  Outcome: Ongoing  Goal: Control of acute pain  Description: Control of acute pain  2/7/2021 1042 by Reema Bridges RN  Outcome: Ongoing  2/6/2021 2338 by Sirena Lo RN  Outcome: Ongoing  Goal: Control of chronic pain  Description: Control of chronic pain  2/7/2021 1042 by Reema Bridges RN  Outcome: Ongoing  2/6/2021 2338 by Sirena Lo RN  Outcome: Ongoing

## 2021-02-08 VITALS
BODY MASS INDEX: 23.32 KG/M2 | SYSTOLIC BLOOD PRESSURE: 108 MMHG | HEIGHT: 68 IN | TEMPERATURE: 97.9 F | HEART RATE: 90 BPM | RESPIRATION RATE: 16 BRPM | WEIGHT: 153.88 LBS | OXYGEN SATURATION: 99 % | DIASTOLIC BLOOD PRESSURE: 64 MMHG

## 2021-02-08 PROCEDURE — 99239 HOSP IP/OBS DSCHRG MGMT >30: CPT | Performed by: NURSE PRACTITIONER

## 2021-02-08 PROCEDURE — 6370000000 HC RX 637 (ALT 250 FOR IP): Performed by: NURSE PRACTITIONER

## 2021-02-08 PROCEDURE — 6370000000 HC RX 637 (ALT 250 FOR IP): Performed by: INTERNAL MEDICINE

## 2021-02-08 PROCEDURE — 6370000000 HC RX 637 (ALT 250 FOR IP): Performed by: PSYCHIATRY & NEUROLOGY

## 2021-02-08 RX ORDER — TRAZODONE HYDROCHLORIDE 150 MG/1
150 TABLET ORAL NIGHTLY
Qty: 30 TABLET | Refills: 1 | Status: SHIPPED | OUTPATIENT
Start: 2021-02-08

## 2021-02-08 RX ORDER — ATORVASTATIN CALCIUM 20 MG/1
20 TABLET, FILM COATED ORAL NIGHTLY
Qty: 30 TABLET | Refills: 3 | Status: SHIPPED | OUTPATIENT
Start: 2021-02-08

## 2021-02-08 RX ORDER — AMMONIUM LACTATE 12 G/100G
LOTION TOPICAL
Qty: 1 BOTTLE | Refills: 0 | Status: SHIPPED | OUTPATIENT
Start: 2021-02-08

## 2021-02-08 RX ORDER — NICOTINE 21 MG/24HR
1 PATCH, TRANSDERMAL 24 HOURS TRANSDERMAL DAILY
Qty: 30 PATCH | Refills: 1 | Status: SHIPPED | OUTPATIENT
Start: 2021-02-09

## 2021-02-08 RX ORDER — HALOPERIDOL 5 MG
5 TABLET ORAL NIGHTLY
Qty: 30 TABLET | Refills: 1 | Status: SHIPPED | OUTPATIENT
Start: 2021-02-08

## 2021-02-08 RX ORDER — HALOPERIDOL DECANOATE 100 MG/ML
150 INJECTION INTRAMUSCULAR
Qty: 1.5 ML | Refills: 1 | Status: SHIPPED | OUTPATIENT
Start: 2021-02-17

## 2021-02-08 RX ORDER — DIVALPROEX SODIUM 250 MG/1
750 TABLET, EXTENDED RELEASE ORAL DAILY
Qty: 30 TABLET | Refills: 1 | Status: SHIPPED | OUTPATIENT
Start: 2021-02-08

## 2021-02-08 RX ORDER — BENZTROPINE MESYLATE 2 MG/1
2 TABLET ORAL NIGHTLY
Qty: 60 TABLET | Refills: 1 | Status: SHIPPED | OUTPATIENT
Start: 2021-02-08

## 2021-02-08 RX ADMIN — MUPIROCIN: 20 OINTMENT TOPICAL at 08:15

## 2021-02-08 RX ADMIN — LORAZEPAM 1 MG: 1 TABLET ORAL at 10:47

## 2021-02-08 RX ADMIN — LORAZEPAM 1 MG: 1 TABLET ORAL at 05:37

## 2021-02-08 RX ADMIN — BUDESONIDE AND FORMOTEROL FUMARATE DIHYDRATE 2 PUFF: 80; 4.5 AEROSOL RESPIRATORY (INHALATION) at 08:14

## 2021-02-08 NOTE — PLAN OF CARE
Problem: Anger Management/Homicidal Ideation:  Goal: Able to display appropriate communication and problem solving  Description: Able to display appropriate communication and problem solving  2/7/2021 2231 by Sameer Figueroa RN  Outcome: Ongoing  2/7/2021 1042 by Ingrid Morris RN  Outcome: Ongoing  Goal: Ability to verbalize frustrations and anger appropriately will improve  Description: Ability to verbalize frustrations and anger appropriately will improve  2/7/2021 2231 by Sameer Figueroa RN  Outcome: Ongoing  2/7/2021 1042 by Ingrid Morris RN  Outcome: Ongoing  Goal: Absence of angry outbursts  Description: Absence of angry outbursts  2/7/2021 2231 by Sameer Figueroa RN  Outcome: Ongoing  2/7/2021 1042 by Ingrid Morris RN  Outcome: Ongoing  Goal: Absence of homicidal ideation  Description: Absence of homicidal ideation  2/7/2021 2231 by Sameer Figueroa RN  Outcome: Ongoing  2/7/2021 1042 by Ingrid Morris RN  Outcome: Ongoing  Goal: Participates in care planning  Description: Participates in care planning  2/7/2021 2231 by Sameer Figueroa RN  Outcome: Ongoing  2/7/2021 1042 by Ingrid Morris RN  Outcome: Ongoing  Goal: Patient specific goal  Description: Patient specific goal  2/7/2021 2231 by Sameer Figueroa RN  Outcome: Ongoing  2/7/2021 1042 by Ingrid Morris RN  Outcome: Ongoing

## 2021-02-08 NOTE — DISCHARGE SUMMARY
Sarita Johnston  [id number]    Admission date:   1/27/2021    Discharge:   Date: 02/08/21  Location: [facility]    Inpatient [facility] Baldev [facility] D.O. [facility]  Unit: [facility]    Diagnosis on Discharge: Active Hospital Problems    Diagnosis Date Noted    Schizoaffective disorder, bipolar type ([facility] [address] [F25.0] 01/27/2021       Reason for Admission:  Schizoaffective disorder      Hospital Course:   Patient was seen and evaluated by a multidisciplinary treatment team, in this evaluation patient was able to contribute freely. Patient was able to provide informed consent and outline the risks and benefits of medications. Sarita Johnston was  compliant with medications. Pt noted a significant reduction in his depression and anxiety during his  stay on SBU. Pt noted that he slowly improved to the point that he   was comfortable and safe to return home. Pt noted he felt his medications were  working well and denied any current side effects. Treatment team encouraged  patient to stay out of bed and try to find activities to do, verbalized  understanding. Patient reported that he felt safe on the unit and comfortable  for discharge. Pt denied suicidal/homicidal ideations, denied any problems  or concerns with medications or side effects. patient voiced progression towards  treatment goals and was offered a copy of updated treatment plan completed  during visit today. Denied any immediate needs or concerns. Pt throughout his stay on SBU pt felt like his medications were working and  felt comfortable being discharged on these medications. Pt was advised to take  all medications as prescribed, follow up with all scheduled appointments and  abstain from any alcohol or illicit substances. Pt was in agreement. Pt felt  safe and comfortable to be discharge and to follow up with outpt mental health. Pt was very optimistic   about his D/C and returning to his home.    Pt stated that  he was doing \"good,\" today. Pt stated that he slept \"about 4.50 hours,\" last night. Pt stated that his appetite is \"good. \"  Pt stated that he rates his depression a  \"0,\" on a scale of 0-10 with 10 being the worst and 0 being none. Pt stated  that he rates his anxiety an \"2/10,\" on the same scale. Pt denies any auditory  or visual hallucinations. Pt denied any thoughts to harm himself or anyone else. Pt felt safe and comfortable for D/C. The Pt was educated primarily by verbal means about his diagnoses and their  manifestations in his life. The option for treatment including group individual  therapy programming was offered to him and the use of medications with all their  potential risks, benefits, and side-effects were discussed with the pt at  length. Pt was given the opportunity to ask questions and he participated in the  treatment and planning process. Pt felt ready and eager to be discharged from  the from the SBU unit. Pt felt he was safe for this disposition. Pt was considered to be able to  participate in informed consent and decision-making with respect to medical,  legal and financial issues at the time of his discharge from the SBU. Complications:   Na+ was low, added Sport drink and no free water. Back to defined limits      Treatment options, medications and alternatives reviewed with Thejess Bowser and they agree with the plan to discharge. Discharge on regular  diet, continue activity as tolerated. Patient appears to be in stable condition and close to their baseline functioning. The patient denies suicidal or homicidal ideations and is showing future orientation. Patient no longer presented an imminent risk of danger to themselves and/or others. At the time of discharge it appears that the patient has received the maximum medical benefit from this hospitalization and can be appropriately managed with community treatment.            Medication List      START taking these medications    ammonium lactate 12 % lotion  Commonly known as: LAC-HYDRIN  Apply topically as needed. benztropine 2 MG tablet  Commonly known as: COGENTIN  Take 1 tablet by mouth nightly     divalproex 250 MG extended release tablet  Commonly known as: DEPAKOTE ER  Take 3 tablets by mouth daily     haloperidol 5 MG tablet  Commonly known as: HALDOL  Take 1 tablet by mouth nightly     haloperidol decanoate 100 MG/ML injection  Commonly known as: HALDOL DECANOATE  Inject 1.5 mLs into the muscle every 14 days Patient is scheduled for next injection on 2/17/2021.   Start taking on: February 17, 2021     nicotine 21 MG/24HR  Commonly known as: [id number] [facility] 1 patch onto the skin daily  Start taking on: February 9, 2021     traZODone 150 MG tablet  Commonly known as: DESYREL  Take 1 tablet by mouth nightly        CHANGE how you take these medications    atorvastatin 20 MG tablet  Commonly known as: LIPITOR  Take 1 tablet by mouth nightly  What changed: when to take this        CONTINUE taking these medications    Ventolin  (90 Base) MCG/ACT inhaler  Generic drug: albuterol sulfate HFA        STOP taking these medications    ARIPiprazole 30 MG tablet  Commonly known as: ABILIFY     doxepin 10 MG capsule  Commonly known as: SINEQUAN     famotidine 40 MG tablet  Commonly known as: PEPCID     ibuprofen 600 MG tablet  Commonly known as: ADVIL;MOTRIN     Mobic 15 MG tablet  Generic drug: meloxicam     mupirocin 2 % ointment  Commonly known as: BACTROBAN     sulfamethoxazole-trimethoprim 800-160 MG per tablet  Commonly known as: BACTRIM DS;SEPTRA DS     Valbenazine Tosylate 80 MG Caps           Where to Get Your Medications      These medications were sent to [facility] [address] [place] [address],[address] [facility] [address]    Hours: 24-hours Phone: [phone]   · ammonium lactate 12 % lotion  · atorvastatin 20 MG tablet  · benztropine 2 MG tablet  · divalproex 250 MG extended release tablet  · haloperidol 5 MG tablet  · haloperidol decanoate 100 MG/ML injection  · nicotine 21 MG/24HR  · traZODone 150 MG tablet         Social History     Socioeconomic History    Marital status: Life Partner     Spouse name: Not on file    Number of children: 0    Years of education: GED    Highest education level: Not on file   Occupational History    Not on file   Social Needs    Financial resource strain: Not on file    Food insecurity     Worry: Not on file     Inability: Not on file    Transportation needs     Medical: Not on file     Non-medical: Not on file   Tobacco Use    Smoking status: Current Every Day Smoker     Packs/day: 1.00     Types: Cigarettes, Cigars    Tobacco comment: refused   Substance and Sexual Activity    Alcohol use: Yes     Frequency: 4 or more times a week     Drinks per session: 5 or 6     Binge frequency: Daily or almost daily     Comment: reports he has not drank since christmas 2020    Drug use: Yes     Types: IV, Marijuana, Methamphetamines, Opiates , Cocaine    Sexual activity: Yes     Partners: Female     Comment: awa in [facility] x 5 years   Lifestyle    Physical activity     Days per week: Not on file     Minutes per session: Not on file    Stress: Not on file   Relationships    Social connections     Talks on phone: Not on file     Gets together: Not on file     Attends Worship service: Not on file     Active member of club or organization: Not on file     Attends meetings of clubs or organizations: Not on file     Relationship status: Not on file    Intimate partner violence     Fear of current or ex partner: Not on file     Emotionally abused: Not on file     Physically abused: Not on file     Forced sexual activity: Not on file   Other Topics Concern    Not on file   Social History Narrative    Not on file       Past Medical History:   Diagnosis Date    Arthritis     COPD (chronic obstructive for outpatient treatment    DISCHARGE DIET:  Resume previous home diet    ACTI VITES:  As tolerated    FOLLOWUP APPOINTMENT(S):  Per aftercare summary    CONSULTS:  [address] Directive POA was offered and reviewed with pt:      Talked to pts poa,went over patients benefits and other matters. Reveiwed financial options /programs ect. .. CORE MEASURES  -Was the patient discharged on two or more Antipsychotics? No    -If multiple Antipsychotic medications prescribed,is tapering recommended? na    ? If yes, document plan:  ?  -If multiple Antipsychotic medications prescribed at discharge, is cross tapering in process at D/C? Na    -Have there been 3 or more failed attempts of mono therapy? ?no    -If yes, list at least 3 of the failed Antipsychotic medications with the reason why each one failed: ?NA    -Was Hemoglobin A1C or fasting Glucose measure done within the last 12 months? Yes    -Lipid Panel measure done within the last 12 months?yes    -Pt was offered an FDA approved medication for Chemical Dependency: (offered refused/ordered)na  -Pt was offered for discharged on tobacco/nicotine cessation and/or codependency cessation via medication assistance: (offered refused/ordered) nicotine patch sent to rx. More than 30 mins was spent face to face with the patient to discuss the diagnosis, treatment recommendations, and prognosis. Safety planning was also reviewed. The patient agreed to go to the nearest ER or call 911 if they experienced an emergency        The patient was admitted to the psychiatric unit and monitored for stabilization. A multidisciplinary team met with the patient on a daily basis. The diagnosis, treatment recommendations, and prognosis were reviewed with the patient.       Objective:  Vital signs in last 24 hours:  Vitals:    02/08/21 0805   BP: 108/64   Pulse: 90   Resp: 16   Temp: 97.9 °F (36.6 °C)   SpO2: 99%       Labs:      Recent Results (from the past 504 hour(s)) TSH without Reflex    Collection Time: 01/28/21  7:00 AM   Result Value Ref Range    TSH, High Sensitivity 1.150 0.270 - 4.20 uIu/ml   T4, free    Collection Time: 01/28/21  7:00 AM   Result Value Ref Range    T4 Free 1.20 0.9 - 1.8 NG/DL   Lipid, Fasting    Collection Time: 01/28/21  7:00 AM   Result Value Ref Range    Triglyceride, Fasting 96 <150 MG/DL    Cholesterol, Fasting 151 <200 MG/DL    HDL 62 >40 MG/DL    LDL Direct 77 <100 MG/DL   Comprehensive Metabolic Panel w/ Reflex to MG    Collection Time: 01/28/21  7:00 AM   Result Value Ref Range    Sodium 123 (L) 135 - 145 MMOL/L    Potassium 4.4 3.5 - 5.1 MMOL/L    Chloride 89 (L) 99 - 110 mMol/L    CO2 29 21 - 32 MMOL/L    BUN 21 6 - 23 MG/DL    CREATININE 1.1 0.9 - 1.3 MG/DL    Glucose 81 70 - 99 MG/DL    Calcium 8.7 8.3 - 10.6 MG/DL    Albumin 4.2 3.4 - 5.0 GM/DL    Total Protein 6.8 6.4 - 8.2 GM/DL    Total Bilirubin 0.2 0.0 - 1.0 MG/DL    ALT 16 10 - 40 U/L    AST 22 15 - 37 IU/L    Alkaline Phosphatase 151 (H) 40 - 129 IU/L    GFR Non-African American >60 >60 mL/min/1.73m2    GFR African American >60 >60 mL/min/1.73m2    Anion Gap 5 4 - 16   Hepatic function panel    Collection Time: 01/28/21  7:00 AM   Result Value Ref Range    Albumin 4.2 3.4 - 5.0 GM/DL    Total Bilirubin 0.2 0.0 - 1.0 MG/DL    Bilirubin, Direct 0.2 0.0 - 0.3 MG/DL    Bilirubin, Indirect 0.0 0 - 0.7 MG/DL    Alkaline Phosphatase 151 (H) 40 - 129 IU/L    AST 22 15 - 37 IU/L    ALT 16 10 - 40 U/L    Total Protein 6.8 6.4 - 8.2 GM/DL   Hemoglobin A1c    Collection Time: 01/28/21  7:00 AM   Result Value Ref Range    Hemoglobin A1C 5.7 4.2 - 6.3 %    eAG 117 mg/dL   RPR     Collection Time: 01/28/21  7:00 AM   Result Value Ref Range    RPR NON REACTIVE NON REACTIVE   Basic Metabolic Panel w/ Reflex to MG    Collection Time: 01/30/21  2:00 PM   Result Value Ref Range    Sodium 134 (L) 135 - 145 MMOL/L    Potassium 4.4 3.5 - 5.1 MMOL/L    Chloride 97 (L) 99 - 110 mMol/L    CO2 31 21 - 32 MMOL/L    Anion Gap 6 4 - 16    BUN 13 6 - 23 MG/DL    CREATININE 1.0 0.9 - 1.3 MG/DL    Glucose 107 (H) 70 - 99 MG/DL    Calcium 9.4 8.3 - 10.6 MG/DL    GFR Non-African American >60 >60 mL/min/1.73m2    GFR  >60 >60 AO/RJM/1.58F0   Basic Metabolic Panel w/ Reflex to MG    Collection Time: 02/02/21  7:00 AM   Result Value Ref Range    Sodium 137 135 - 145 MMOL/L    Potassium 4.7 3.5 - 5.1 MMOL/L    Chloride 100 99 - 110 mMol/L    CO2 33 (H) 21 - 32 MMOL/L    Anion Gap 4 4 - 16    BUN 11 6 - 23 MG/DL    CREATININE 0.8 (L) 0.9 - 1.3 MG/DL    Glucose 98 70 - 99 MG/DL    Calcium 9.7 8.3 - 10.6 MG/DL    GFR Non-African American >60 >60 mL/min/1.73m2    GFR African American >60 >60 mL/min/1.73m2   Valproic acid level, total    Collection Time: 02/04/21  7:00 AM   Result Value Ref Range    Valproic Acid Lvl 45.2 (L) 50 - 100 UG/ML    DOSE AMOUNT DOSE AMT.  GIVEN - 750 mg ER     DOSE TIME DOSE TIME GIVEN - 0900        40 Minutes    Electronically signed by GARCIA Cuevas CNP on 2/8/2021 at 9:55 AM

## 2021-02-08 NOTE — PROGRESS NOTES
Patient voiced want for shower. Set Up only, Independent. Patients linens changed and clothing washed.

## 2021-02-08 NOTE — PROGRESS NOTES
Patient alert and oriented. Movements increased. C/o feeling anxious. Worried about where he is going to go. Threatening to not leave. States he did not know he was being taken to [facility]. Voiced relief after receiving this information. Cooperative. Patient discharged with all documented belongings and discharge paper to [facility] per [place].

## 2021-02-08 NOTE — GROUP NOTE
Group Therapy Note    Date: 2/8/2021    Group Start Time: 0820  Group End Time: 0900    Number of Participants: 8/9    Type: Morning Goals Group/ Community Meeting    Group Topic/Objective: Set Goal For The Day and to review Unit Rules and Regulations. Patient's Goal:  Pt states his goal is \"Run through the paper. \"    Notes:  Pt presented with rapid, pressured speech, but was noted noted with tangential speech or flight of ideas. Pt states he is feeling \"fine\" and is grateful \"not to be in this weather. \"  Pt reports restful sleep of ~8 hours. Depression (0-10): 1-2    Anxiety (0-10): 2    Irritability/Aggitation (0-10): 0    Status After Intervention:  Improved    Participation Level:  Active Listener and Interactive    Participation Quality: Appropriate, Attentive and Sharing    Speech:  normal    Thought Process/Content: Logical    Affective Functioning: Congruent    Mood: Bright    Level of consciousness:  Alert and Attentive    Response to Learning: Able to verbalize current knowledge/experience    Endings: None Reported    Modes of Intervention: Education, Support and Socialization    Discipline Responsible: Certified Therapeutic Recreation Specialist     Electronically signed by Waqas Fields MA on 2/8/2021 at 11:42 AM

## 2021-02-08 NOTE — GROUP NOTE
Group Therapy Note    Date: 2/8/2021    Group Start Time: 1030  Group End Time: 1120  Group Topic: Psychoeducation    [address] [place], [place]        Group Therapy Note    Attendees: 7/9       Notes:  Pts participated in recreational therapy group; [employer]. Pts and therapist utilized various activities to reminisce about their past and share stories. Purpose was to encourage positive thinking and socialization. Pt encouraged to attend, pt refused.        Discipline Responsible: Certified Therapeutic Recreation Specialist       Signature:  Margarita Flowers [place]

## 2021-02-08 NOTE — DISCHARGE INSTR - COC
Continuity of Care Form    Patient Name: Tobi Roberto   :  1960  MRN:  [id number]    Admit date:  2021  Discharge date:  ***    Code Status Order: Full Code   Advance Directives:   Advance Care Flowsheet Documentation       Date/Time Healthcare Directive Type of Healthcare Directive Copy in [address] Agent's Name Healthcare Agent's Phone Number    21 1612  No, patient does not have an advance directive for healthcare treatment -- -- -- -- --            Admitting Physician:  Camden Figueroa DO  PCP: No primary care provider on file. Discharging Nurse: [facility] Unit/Room#: 1046/1046-01  Discharging Unit Phone Number: ***    Emergency Contact:   Extended Emergency Contact Information  Primary Emergency Contact: Zaria Mcmillan  Address: [place], [address] [place] [address] Drive  Home Phone: [phone]  Relation: Parent    Past Surgical History:  Past Surgical History:   Procedure Laterality Date    PILONIDAL CYST EXCISION      TUMOR REMOVAL      tumor removed from chest as a baby       Immunization History: There is no immunization history for the selected administration types on file for this patient.     Active Problems:  Patient Active Problem List   Diagnosis Code    Schizoaffective disorder, bipolar type ([facility] F25.0       Isolation/Infection:   Isolation            No Isolation          Patient Infection Status       None to display            Nurse Assessment:  Last Vital Signs: /64   Pulse 90   Temp 97.9 °F (36.6 °C) (Temporal)   Resp 16   Ht 5' 8\" (1.727 m)   Wt 153 lb 14.1 oz (69.8 kg)   SpO2 99%   BMI 23.40 kg/m²     Last documented pain score (0-10 scale): Pain Level: 1  Last Weight:   Wt Readings from Last 1 Encounters:   21 153 lb 14.1 oz (69.8 kg)     Mental Status:  {IP PT MENTAL STATUS::::0}    IV Access:  { NIYAH IV ACCESS:672621319:::0}    Nursing Mobility/ADLs:  Walking   {[facility] DME Lucinda Whelan  Requests flu vaccine. Denies fever and egg allergy. VIS information sheet given to patient. Explained possible s/e. Reviewed s/sx indicating need to be seen in ER. Patient had no adverse reaction at time of discharge. Entered into VIIS. [facility], Anthony Mcintosh RN.  Chrissy Melara RN ADLs:357392027:::0}  Transfer  {[facility] DME ADLs:840686674:::0}  Bathing  {CHP DME ADLs:433575720:::0}  Dressing  {CHP DME ADLs:095097962:::0}  Toileting  {CHP DME ADLs:480214098:::0}  Feeding  {CHP DME ADLs:793614220:::0}  Med Admin  {CHP DME ADLs:128739314:::0}  Med Delivery   {[facility] MED Delivery:425040720:::0}    Wound Care Documentation and Therapy:        Elimination:  Continence: Bowel: {YES / HB:56004}  Bladder: {YES / LE:57983}  Urinary Catheter: {Urinary Catheter:995340883:::0}   Colostomy/Ileostomy/Ileal Conduit: {YES / RX:51127}       Date of Last BM: ***    Intake/Output Summary (Last 24 hours) at 2021 1006  Last data filed at 2021 0805  Gross per 24 hour   Intake 720 ml   Output --   Net 720 ml     I/O last 3 completed shifts:   In: 5 [P.O.:720]  Out: -     Safety Concerns:     [address] Safety Concerns:878224552:::0}    Impairments/Disabilities:      [address] Impairments/Disabilities:477931563:::0}    Nutrition Therapy:  Current Nutrition Therapy:   [address] Diet List:200349837:::0}    Routes of Feeding: {CHP DME Other Feedings:776683502:::0}  Liquids: {Slp liquid thickness:56902}  Daily Fluid Restriction: {CHP DME Yes amt example:283354155:::0}  Last Modified Barium Swallow with Video (Video Swallowing Test): {Done Not Done STTZ:667375759:::1}    Treatments at the Time of Hospital Discharge:   Respiratory Treatments: ***  Oxygen Therapy:  {Therapy; copd oxygen:67999:::0}  Ventilator:    { CC Vent List:056855004:::0}    Rehab Therapies: {THERAPEUTIC INTERVENTION:4635406034}  Weight Bearing Status/Restrictions: { CC Weight Bearin:::0}  Other Medical Equipment (for information only, NOT a DME order):  {EQUIPMENT:100318847}  Other Treatments: ***    Patient's personal belongings (please select all that are sent with patient):  {CHP DME Belongings:931597996:::0}    RN SIGNATURE:  {Esignature:944049314:::0}    CASE MANAGEMENT/SOCIAL WORK SECTION    Inpatient Status Date: ***    Readmission Risk Assessment Score:  Readmission Risk              Risk of Unplanned Readmission:        6           Discharging to Facility/ Agency   Name:   Address:  Phone:  Fax:    Dialysis Facility (if applicable)   Name:  Address:  Dialysis Schedule:  Phone:  Fax:    / signature: {Esignature:993039377:::0}    PHYSICIAN SECTION    Prognosis: Good    Condition at Discharge: Stable    Rehab Potential (if transferring to Rehab): Good    Recommended Labs or Other Treatments After Discharge: per patient's pcp    Physician Certification: I certify the above information and transfer of Carin Jose  is necessary for the continuing treatment of the diagnosis listed and that he requires Home Care for greater 30 days.      Update Admission H&P: No change in H&P    PHYSICIAN SIGNATURE:  Electronically signed by GARCIA Garcia CNP on 2/8/21 at 10:07 AM EST

## 2021-02-08 NOTE — BH NOTE
Spenser Alfonso was visible on the milieu. He spends his time writing and coloring. He does not interact with peers. On approach he is polite and cooperative. He smiles and states that he is happy that the doctor is not going to kick him out of here to the cold streets. Stated that he does not know when he will be discharged. No mention of the social security issue and his money. He endorsed a little depression and more anxiety. Denied SI/HI and hallucinations. Continues with mumbled speech that is somewhat rapid at times. Medication reviewed, he voiced understanding and was compliant.

## 2021-02-08 NOTE — PROGRESS NOTES
Psychiatric Progress Note    Germania Rojas  [id number]  02/08/21    ID: Patient is a 61 yrs y.o. male     CC: I need to order things from waterbeds and more. ..... Jayne Carrizales    HPI: Germania Rojas is a 61year old  single male with PMHx of schizoaffective disorder, paranoid type, COPD, GERD, arthritis,  and HLD. Patient was sent to SNF from [facility] 1/20/[facility], as he required wound care and oral antibiotics after podiatry debrided wound. Patient returned to hospital after leaving SNF AMA. SNF, per notes will not take him back due to behavioral issues. While at the hospital patient was placed on a psychiatric hold due to HI and delusions. Other notes sent with the patient report he has been \"grandiose, disorganized, pressured speech and responding to internal stimuli. \" Notes state he asked about Haldol dec but \"not having a shot in a long time.:\" Patient was transferred from [address] [place] [address] to [facility]. When he arrived he signed the voluntary form. Pt remains in agreement with treatment plan. Pt denied any side-effects to current medications. Pt was polite and cordal during the interview process. Pt remains compliant with current medications and treatment. Pt noted he is doing  \"really good today. \"  Pt noted he feels safe and comfortable on the unit. Pt was polite and cordial during the interview process. Currently he denies SI/HI. Pt denies any AV hallucinations yet continues to display bizarre delusions at times. He rates his depression as \"2\" on a scale of 0 to 10 with 0 being none and 10 being horrible. He rates his anxiety as \"0\" on the same scale. At times speech continues to be rapid and pressured yet significantly reduced. He states he was not able to sleep last night; per staff he slept 6 hours of broken sleep. He is oriented x 4    He states he was linked in the past with [facility] and would like to return to them.  He also states his Rx is in [address] [place] [address], [employer] off [facility].    No Known Allergies    Medications Prior to Admission: sulfamethoxazole-trimethoprim (BACTRIM DS;SEPTRA DS) 800-160 MG per tablet, Take 1 tablet by mouth 2 times daily  atorvastatin (LIPITOR) 20 MG tablet, Take 20 mg by mouth daily  famotidine (PEPCID) 40 MG tablet, Take 40 mg by mouth daily At bedtime  ibuprofen (ADVIL;MOTRIN) 600 MG tablet, Take 600 mg by mouth every 8 hours as needed for Pain  albuterol sulfate HFA (VENTOLIN HFA) 108 (90 Base) MCG/ACT inhaler, Inhale 2 puffs into the lungs every 6 hours as needed for Wheezing  ARIPiprazole (ABILIFY) 30 MG tablet, Take 30 mg by mouth daily  meloxicam (MOBIC) 15 MG tablet, Take 15 mg by mouth daily as needed for Pain Indications: Adjunct to Opioid Analgesia for Moderate to Severe Pain  doxepin (SINEQUAN) 10 MG capsule, Take 10 mg by mouth nightly  Valbenazine Tosylate 80 MG CAPS, Take by mouth daily  mupirocin (BACTROBAN) 2 % ointment, Apply topically daily Apply topically 3 times daily. Past Medical History:   Diagnosis Date    Arthritis     COPD (chronic obstructive pulmonary disease) ([facility]     Hyperlipidemia         Patient Active Problem List   Diagnosis    Schizoaffective disorder, bipolar type ([facility]       Review of Systems    OBJECTIVE  Vital Signs:  Vitals:    02/07/21 2006   BP: 115/71   Pulse: 83   Resp: 18   Temp: 98.6 °F (37 °C)   SpO2: 98%       Labs:  No results found for this or any previous visit (from the past 48 hour(s)). PSYCHIATRIC ASSESSMENT / MENTAL STATUS EXAM:   Vitals: Blood pressure 115/71, pulse 83, temperature 98.6 °F (37 °C), temperature source Temporal, resp. rate 18, height 5' 8\" (1.727 m), weight 153 lb 14.1 oz (69.8 kg), SpO2 98 %. CONSTITUTIONAL:      Appearance: appears stated age. alert and oriented to person, place, time & situation. no acute distress. Adequate grooming and hygeine. Good eye contact. No prominent physical abnormalities. Attitude:  Manner is cooperative and pleasant but with boughts of irritability  Motor: No psychomotor agitation, retardation or abnormal movements noted  Speech: Pressured and difficult to interrupt, Normal volume, tone &  Increased amount. Language: intact understanding and production  Mood: depressed  Affect: euthymic, full range, non-labile, congruent with mood and content of speech  Thought Production: Spontaneous. Thought Form: Coherent, linear, logical & goal-directed. No tangentiality or circumstantiality. No flight of ideas or loosening of associations. Thought Content/Perceptions: No JANE, no AVH, positive delusions and paranoia  Insight: limited  Judgment- limited  Memory: Immediate, recent, and remote appear intact, though not formally tested. Attention: maintained throughout interview  Fund of knowledge: Average  Gait/Balance: WNL/WNL         Impression:   Schizoaffective, currently depressed and paranoid     Problem List:   Schizoaffective disorder, bipolar type ([facility]         PLAN:  Psychiatric management:medication initiation and titration, group and individual therapy, safe and theraputic environment. Status of problem/condition: ?Improving  Medical co-morbidities: [facility], appreciate assistance  Legal Status:Pending  Disposition:estimated LOS: Pending. The treatment team reviewed with the patient the diagnosis and treatment recommendations to include the risks, benefits, and side effects of chosen medications. The patient verbalized understanding and agreed with the treatment regimen as outlined above. The patient was encouraged to participate in groups. Medical records, Labs, Diagnotic tests reviewed  q15 min safety checks for safety  Interval History. Review current labs   Supportive Therapy Provided  Pt had an opportunity to ask questions and address concerns  Pt encouraged to continue therapy group or individual.  Pt was in agreement with treatment plan.   The risks benefits and side effects of medications were discussed with the patient, including alternatives and no treatment.     Electronically signed by Natalee Figueroa DO on 2/8/2021 at 7:56 AM

## 2023-06-20 NOTE — PLAN OF CARE
Problem: Anger Management/Homicidal Ideation:  Goal: Able to display appropriate communication and problem solving  Description: Able to display appropriate communication and problem solving  2/4/2021 2044 by Leta Rizo RN  Outcome: Ongoing  2/4/2021 1013 by Margo Merino RN  Outcome: Ongoing  Goal: Ability to verbalize frustrations and anger appropriately will improve  Description: Ability to verbalize frustrations and anger appropriately will improve  2/4/2021 2044 by Leta Rizo RN  Outcome: Ongoing  2/4/2021 1013 by Margo Merino RN  Outcome: Ongoing  Goal: Absence of angry outbursts  Description: Absence of angry outbursts  2/4/2021 2044 by Leta Rizo RN  Outcome: Ongoing  2/4/2021 1013 by Margo Merino RN  Outcome: Ongoing  Goal: Absence of homicidal ideation  Description: Absence of homicidal ideation  2/4/2021 2044 by Leta Rizo RN  Outcome: Ongoing  2/4/2021 1013 by Margo Merino RN  Outcome: Ongoing  Goal: Participates in care planning  Description: Participates in care planning  2/4/2021 2044 by Leta Rizo RN  Outcome: Ongoing  2/4/2021 1013 by Margo Merino RN  Outcome: Ongoing  Goal: Patient specific goal  Description: Patient specific goal  2/4/2021 2044 by Leta Rizo RN  Outcome: Ongoing  2/4/2021 1013 by Margo Merino RN  Outcome: Ongoing What Type Of Note Output Would You Prefer (Optional)?: Bullet Format Hpi Title: Evaluation of Skin Lesions

## 2023-10-10 NOTE — PLAN OF CARE
Problem: Anger Management/Homicidal Ideation:  Goal: Able to display appropriate communication and problem solving  Description: Able to display appropriate communication and problem solving  2/4/2021 1013 by Esme Yates RN  Outcome: Ongoing  2/4/2021 0323 by Trace Simeon RN  Outcome: Ongoing  Goal: Ability to verbalize frustrations and anger appropriately will improve  Description: Ability to verbalize frustrations and anger appropriately will improve  2/4/2021 1013 by Esme Yates RN  Outcome: Ongoing  2/4/2021 0323 by Trace Simeon RN  Outcome: Ongoing  Goal: Absence of angry outbursts  Description: Absence of angry outbursts  2/4/2021 1013 by Esme Yates RN  Outcome: Ongoing  2/4/2021 0323 by Trace Simeon RN  Outcome: Ongoing  Goal: Absence of homicidal ideation  Description: Absence of homicidal ideation  2/4/2021 1013 by Esme Yates RN  Outcome: Ongoing  2/4/2021 0323 by Trace Simeon RN  Outcome: Ongoing  Goal: Participates in care planning  Description: Participates in care planning  2/4/2021 1013 by Esme Yates RN  Outcome: Ongoing  2/4/2021 0323 by Trace Simeon RN  Outcome: Ongoing  Goal: Patient specific goal  Description: Patient specific goal  2/4/2021 1013 by Esme Yates RN  Outcome: Ongoing  2/4/2021 0323 by Trace Simeon RN  Outcome: Ongoing     Problem: Pain:  Description: Pain management should include both nonpharmacologic and pharmacologic interventions.   Goal: Pain level will decrease  Description: Pain level will decrease  2/4/2021 1013 by Esme Yates RN  Outcome: Ongoing  2/4/2021 0323 by Trace Simeon RN  Outcome: Ongoing  Goal: Control of acute pain  Description: Control of acute pain  2/4/2021 1013 by Esme Yates RN  Outcome: Ongoing  2/4/2021 0323 by Trace Simeon RN  Outcome: Ongoing  Goal: Control of chronic pain  Description: Control of chronic pain  2/4/2021 1013 by Esme Yates RN  Outcome: Ongoing  2/4/2021 0323 by Riya Tran Meliza Co, RN  Outcome: Ongoing H/O intracranial hemorrhage H/O intracranial hemorrhage H/O intracranial hemorrhage H/O intracranial hemorrhage H/O intracranial hemorrhage H/O intracranial hemorrhage H/O intracranial hemorrhage